# Patient Record
Sex: FEMALE | Race: WHITE | NOT HISPANIC OR LATINO | Employment: OTHER | URBAN - NONMETROPOLITAN AREA
[De-identification: names, ages, dates, MRNs, and addresses within clinical notes are randomized per-mention and may not be internally consistent; named-entity substitution may affect disease eponyms.]

---

## 2017-06-07 ENCOUNTER — OFFICE VISIT (OUTPATIENT)
Dept: CARDIOLOGY | Facility: CLINIC | Age: 82
End: 2017-06-07

## 2017-06-07 VITALS — HEIGHT: 61 IN | DIASTOLIC BLOOD PRESSURE: 68 MMHG | HEART RATE: 72 BPM | SYSTOLIC BLOOD PRESSURE: 122 MMHG

## 2017-06-07 DIAGNOSIS — I25.10 CORONARY ARTERY DISEASE INVOLVING NATIVE CORONARY ARTERY OF NATIVE HEART WITHOUT ANGINA PECTORIS: ICD-10-CM

## 2017-06-07 DIAGNOSIS — Z95.1 HX OF CABG: ICD-10-CM

## 2017-06-07 DIAGNOSIS — I49.5 SSS (SICK SINUS SYNDROME) (HCC): Primary | ICD-10-CM

## 2017-06-07 DIAGNOSIS — I10 ESSENTIAL HYPERTENSION: ICD-10-CM

## 2017-06-07 DIAGNOSIS — E78.00 HYPERCHOLESTEREMIA: ICD-10-CM

## 2017-06-07 DIAGNOSIS — R60.0 LOCALIZED EDEMA: ICD-10-CM

## 2017-06-07 PROCEDURE — 99213 OFFICE O/P EST LOW 20 MIN: CPT | Performed by: NURSE PRACTITIONER

## 2017-06-07 PROCEDURE — 93288 INTERROG EVL PM/LDLS PM IP: CPT | Performed by: INTERNAL MEDICINE

## 2017-06-07 RX ORDER — LANOLIN ALCOHOL/MO/W.PET/CERES
1000 CREAM (GRAM) TOPICAL DAILY
COMMUNITY
End: 2018-06-20 | Stop reason: ALTCHOICE

## 2017-06-07 RX ORDER — PRAVASTATIN SODIUM 20 MG
20 TABLET ORAL DAILY
COMMUNITY
End: 2018-06-20 | Stop reason: ALTCHOICE

## 2017-06-07 NOTE — PROGRESS NOTES
Chief Complaint   Patient presents with   • Follow-up     denies any cardiac problems.    • Med Refill     PCP writes refills and moniters labs.    • Pacemaker Check     Completed today.        Subjective       Carla Flores is a 89 y.o. female has history of ischemic heart disease, diagnosed in 1996 when she underwent bypass surgery.  She also has sick sinus syndrome for which she had pacemaker placed in 1996 and has been replaced twice, last in 2011. Pacemaker interrogation done today showed normal function, minimal lead interference noted, which is not a new finding. Patent denies any new or worsening cardiac symptoms.     HPI         Cardiac History:    Past Surgical History:   Procedure Laterality Date   • CARDIAC PACEMAKER PLACEMENT  03/1996    PPM implant.     • CARDIOVASCULAR STRESS TEST  01/04/2002    D. Myoview- 80% THR, Negative.     • CONVERTED (HISTORICAL) DUPLEX SCANS  02/27/2014    Duplex Venous LXT     • CORONARY ARTERY BYPASS GRAFT  1996     LIMA-LAD, SVG- D1, SVG- Ramus, SVG-OM,and SVG-PDA.     • ECHO - CONVERTED  06/15/2007    EF 50-55%, Mild MR and TR.     • ECHO - CONVERTED  03/03/2010    EF >60%     • PACEMAKER REPLACEMENT  12/31/2003    PPM Change out- (Dr. Contreras.)     • PACEMAKER REPLACEMENT  10/13/2008    PPM Change out- ( Dr. Ojeda)     • PACEMAKER REPLACEMENT  05/05/2011    PPM Change out- (Dr. Ojeda) Implantation of (R) Atrila Lead.         Current Outpatient Prescriptions   Medication Sig Dispense Refill   • aspirin 81 MG EC tablet Take 81 mg by mouth Daily.     • atenolol (TENORMIN) 50 MG tablet Take 50 mg by mouth Daily.     • ibuprofen (ADVIL,MOTRIN) 200 MG tablet 2 tabs 3 times a day prn     • Omega-3 Fatty Acids (FISH OIL) 1200 MG capsule delayed-release Take  by mouth.     • pravastatin (PRAVACHOL) 20 MG tablet Take 20 mg by mouth Daily.     • triamterene-hydrochlorothiazide (MAXZIDE) 75-50 MG per tablet Take 1 tablet by mouth Daily.     • vitamin B-12 (CYANOCOBALAMIN) 1000 MCG  tablet Take 1,000 mcg by mouth Daily.       No current facility-administered medications for this visit.        Morphine and related    Past Medical History:   Diagnosis Date   • Arthritis    • CAD (coronary artery disease)    • H/O total knee replacement     bilat   • History of cholecystectomy    • History of hip replacement     bilat   • Hypercholesterolemia    • Hypertension    • IHD (ischemic heart disease)      S/P bypass surgery   • MR (mitral regurgitation)     & AR, both moderate   • Myocardial infarction    • pulse oximetry 07/04/2012    overnight SpO2 abnormal, O2 started, refused sleep study   • SOB (shortness of breath)    • SSS (sick sinus syndrome)        Social History     Social History   • Marital status:      Spouse name: N/A   • Number of children: N/A   • Years of education: N/A     Occupational History   • Not on file.     Social History Main Topics   • Smoking status: Never Smoker   • Smokeless tobacco: Never Used   • Alcohol use No   • Drug use: No   • Sexual activity: Not on file     Other Topics Concern   • Not on file     Social History Narrative       Family History   Problem Relation Age of Onset   • Other Mother      Possible heart problems   • Heart disease Father    • Heart disease Other        Review of Systems   Constitutional: Positive for fatigue. Negative for activity change and appetite change.   HENT: Negative.    Respiratory: Negative for cough, shortness of breath and wheezing.    Cardiovascular: Positive for leg swelling (mild to moderate, lower legs, left > right).   Gastrointestinal: Negative for abdominal pain, blood in stool and nausea.   Genitourinary: Negative for dysuria and hematuria.   Musculoskeletal: Positive for arthralgias and gait problem (uses wheelchair).   Skin: Negative.    Neurological: Positive for weakness (in general). Negative for syncope, speech difficulty and headaches.   Psychiatric/Behavioral: Negative for confusion and sleep disturbance.  "      Diabetes- No  Thyroid-normal    Objective     /68  Pulse 72  Ht 61\" (154.9 cm)    Physical Exam   Constitutional: She is oriented to person, place, and time. Vital signs are normal.   Eyes: Pupils are equal, round, and reactive to light.   Neck: Neck supple. No JVD present. Carotid bruit is not present.   Cardiovascular: Normal rate, regular rhythm, S1 normal and S2 normal.    Murmur heard.   Systolic murmur is present with a grade of 2/6   Pulses:       Radial pulses are 2+ on the right side, and 2+ on the left side.   Pulmonary/Chest: Effort normal and breath sounds normal. She has no wheezes.   Abdominal: Soft. Bowel sounds are normal. She exhibits no distension. There is no tenderness.   Musculoskeletal: She exhibits edema (1-2+ lower legs).   Neurological: She is alert and oriented to person, place, and time.   Skin: Skin is warm and dry.   Psychiatric: She has a normal mood and affect. Her behavior is normal. Judgment and thought content normal.   Vitals reviewed.    Procedures          Assessment/Plan      Carla was seen today for follow-up, med refill and pacemaker check.    Diagnoses and all orders for this visit:    SSS (sick sinus syndrome)    Coronary artery disease involving native coronary artery of native heart without angina pectoris    Essential hypertension    Hx of CABG    Hypercholesteremia    Localized edema     Her pacemaker interrogation today was stable. Blood pressure is normal. A [facility] ppm checked to be repeated in 6 months. No medication changes made today. I encouraged her to elevate lower legs periodically through out the day, which she reports does help. She follows with you for management of labs and medication refills.                Electronically signed by TIERRA Lorenzana,  June 7, 2017 12:58 PM  "

## 2017-11-15 ENCOUNTER — OFFICE VISIT (OUTPATIENT)
Dept: CARDIOLOGY | Facility: CLINIC | Age: 82
End: 2017-11-15

## 2017-11-15 VITALS — HEART RATE: 68 BPM | DIASTOLIC BLOOD PRESSURE: 60 MMHG | SYSTOLIC BLOOD PRESSURE: 130 MMHG

## 2017-11-15 DIAGNOSIS — R60.0 EDEMA, LOWER EXTREMITY: ICD-10-CM

## 2017-11-15 DIAGNOSIS — I49.5 SSS (SICK SINUS SYNDROME) (HCC): Primary | ICD-10-CM

## 2017-11-15 DIAGNOSIS — I49.5 SSS (SICK SINUS SYNDROME) (HCC): ICD-10-CM

## 2017-11-15 DIAGNOSIS — I25.10 CORONARY ARTERY DISEASE INVOLVING NATIVE CORONARY ARTERY OF NATIVE HEART WITHOUT ANGINA PECTORIS: Primary | ICD-10-CM

## 2017-11-15 DIAGNOSIS — I10 ESSENTIAL HYPERTENSION: ICD-10-CM

## 2017-11-15 DIAGNOSIS — R00.2 PALPITATIONS: ICD-10-CM

## 2017-11-15 DIAGNOSIS — Z95.1 HX OF CABG: ICD-10-CM

## 2017-11-15 PROCEDURE — 93280 PM DEVICE PROGR EVAL DUAL: CPT | Performed by: INTERNAL MEDICINE

## 2017-11-15 PROCEDURE — 99213 OFFICE O/P EST LOW 20 MIN: CPT | Performed by: NURSE PRACTITIONER

## 2017-11-15 NOTE — PROGRESS NOTES
Chief Complaint   Patient presents with   • Follow-up     for cardiac management   • Medication list     pt didn't bring meds or list, was not aware she had a follow-up with her PPM check   • Pacemaker Check     [facility], today.        Subjective       Carla Flores is a 89 y.o. female with a history of ischemic heart disease diagnosed in 1996 when she underwent bypass surgery and pacemaker placement secondary to SSS.  Her generator was changed in 2003, 2008, and the last in 2011.  She came in today for her follow up visit and have her pacemaker interrogated.  Pacemaker check did show AMS due to atrial noise, adjustments were made, and more than 5 years remains on battery life.  She denies having any cardiac symptoms, no chest pain, no shortness of breath. She does have left lower extremity edema which she reports has been present for a long time.  She is mostly confined to a wheelchair but does ambulate some at home.  Unfortunately, her  recently passed away.      HPI         Cardiac History:    Past Surgical History:   Procedure Laterality Date   • CARDIAC PACEMAKER PLACEMENT  03/1996    PPM implant.     • CARDIOVASCULAR STRESS TEST  01/04/2002    SARAH [place]- 80% THR, Negative.     • CONVERTED (HISTORICAL) DUPLEX SCANS  02/27/2014    Duplex Venous LXT     • CORONARY ARTERY BYPASS GRAFT  1996     LIMA-LAD, SVG- D1, SVG- Ramus, SVG-OM,and SVG-PDA.     • ECHO - CONVERTED  06/15/2007    EF 50-55%, Mild MR and TR.     • ECHO - CONVERTED  03/03/2010    EF >60%     • PACEMAKER REPLACEMENT  12/31/2003    PPM Change out- (Dr. Contreras.)     • PACEMAKER REPLACEMENT  10/13/2008    PPM Change out- ( Dr. Ojeda)     • PACEMAKER REPLACEMENT  05/05/2011    PPM Change out- (Dr. Ojeda) Implantation of (R) Atrila Lead.         Current Outpatient Prescriptions   Medication Sig Dispense Refill   • aspirin 81 MG EC tablet Take 81 mg by mouth Daily.     • atenolol (TENORMIN) 50 MG tablet Take 50 mg by mouth Daily.     • ibuprofen  (ADVIL,MOTRIN) 200 MG tablet 2 tabs 3 times a day prn     • Omega-3 Fatty Acids (FISH OIL) 1200 MG capsule delayed-release Take  by mouth.     • pravastatin (PRAVACHOL) 20 MG tablet Take 20 mg by mouth Daily.     • triamterene-hydrochlorothiazide (MAXZIDE) 75-50 MG per tablet Take 1 tablet by mouth Daily.     • vitamin B-12 (CYANOCOBALAMIN) 1000 MCG tablet Take 1,000 mcg by mouth Daily.       No current facility-administered medications for this visit.        Morphine and related    Past Medical History:   Diagnosis Date   • Arthritis    • CAD (coronary artery disease)    • H/O total knee replacement     bilat   • History of cholecystectomy    • History of hip replacement     bilat   • Hypercholesterolemia    • Hypertension    • IHD (ischemic heart disease)      S/P bypass surgery   • MR (mitral regurgitation)     & AR, both moderate   • Myocardial infarction    • pulse oximetry 07/04/2012    overnight SpO2 abnormal, O2 started, refused sleep study   • SOB (shortness of breath)    • SSS (sick sinus syndrome)        Social History     Social History   • Marital status:      Spouse name: N/A   • Number of children: N/A   • Years of education: N/A     Occupational History   • Not on file.     Social History Main Topics   • Smoking status: Never Smoker   • Smokeless tobacco: Never Used   • Alcohol use No   • Drug use: No   • Sexual activity: Not on file     Other Topics Concern   • Not on file     Social History Narrative       Family History   Problem Relation Age of Onset   • Other Mother      Possible heart problems   • Heart disease Father    • Heart disease Other        Review of Systems   Constitution: Positive for weakness and malaise/fatigue. Negative for decreased appetite.   HENT: Positive for hearing loss. Negative for congestion.    Eyes: Negative for blurred vision.   Cardiovascular: Positive for leg swelling. Negative for chest pain and syncope.   Respiratory: Negative for cough and shortness of  breath.    Hematologic/Lymphatic: Negative for bleeding problem.   Skin: Negative for color change.   Musculoskeletal: Positive for arthritis, joint pain and muscle weakness. Negative for myalgias.   Gastrointestinal: Negative for abdominal pain.   Genitourinary: Negative for dysuria.   Neurological: Negative for dizziness.   Psychiatric/Behavioral: Negative for altered mental status.        Diabetes- No  Thyroid-unavailable     Objective     /60  Pulse 68    Physical Exam   Constitutional: She is oriented to person, place, and time. She appears well-developed and well-nourished.   HENT:   Head: Normocephalic.   Eyes: Pupils are equal, round, and reactive to light.   Neck: Normal range of motion.   Cardiovascular: Normal rate and regular rhythm.    Murmur heard.  Pulmonary/Chest: Effort normal. No respiratory distress. She has no wheezes. She has no rales.   Abdominal: Soft. Bowel sounds are normal. She exhibits no distension.   Musculoskeletal: She exhibits edema (significant LE edema, L>R, 2-3+ on left ).   Neurological: She is alert and oriented to person, place, and time.   Skin: Skin is warm and dry. There is pallor.   Psychiatric: She has a normal mood and affect.   Nursing note and vitals reviewed.       Procedures          Assessment/Plan    Heart rate and blood pressure stable. Condolences were given regarding her .  Her pacemaker was interrogated with changes made.  No changes made to medications.  She insists her leg swelling goes down at night and she is not having any pain with this.  She has not taken her diuretic today as she had appointments.  She was encouraged to elevate her legs if possible while sitting.  Limit sodium intake as she can.  No cardiac testing ordered today as she remains asymptomatic.  Labs and refills have been managed with primary care.  We will see her back in six months with a pacemaker check or sooner if any new problems develop.   Carla was seen today for  follow-up, medication list and pacemaker check.    Diagnoses and all orders for this visit:    Coronary artery disease involving native coronary artery of native heart without angina pectoris    Edema, lower extremity    Essential hypertension    SSS (sick sinus syndrome)    Hx of CABG                    Electronically signed by TIERRA Andrade,  November 17, 2017 1:48 PM

## 2018-05-24 ENCOUNTER — OUTSIDE FACILITY SERVICE (OUTPATIENT)
Dept: CARDIOLOGY | Facility: CLINIC | Age: 83
End: 2018-05-24

## 2018-05-24 PROCEDURE — 99223 1ST HOSP IP/OBS HIGH 75: CPT | Performed by: INTERNAL MEDICINE

## 2018-05-25 ENCOUNTER — OUTSIDE FACILITY SERVICE (OUTPATIENT)
Dept: CARDIOLOGY | Facility: CLINIC | Age: 83
End: 2018-05-25

## 2018-05-25 PROCEDURE — 99232 SBSQ HOSP IP/OBS MODERATE 35: CPT | Performed by: INTERNAL MEDICINE

## 2018-05-26 ENCOUNTER — OUTSIDE FACILITY SERVICE (OUTPATIENT)
Dept: CARDIOLOGY | Facility: CLINIC | Age: 83
End: 2018-05-26

## 2018-05-26 PROCEDURE — 99232 SBSQ HOSP IP/OBS MODERATE 35: CPT | Performed by: INTERNAL MEDICINE

## 2018-06-20 ENCOUNTER — OFFICE VISIT (OUTPATIENT)
Dept: CARDIOLOGY | Facility: CLINIC | Age: 83
End: 2018-06-20

## 2018-06-20 VITALS — HEIGHT: 61 IN | HEART RATE: 78 BPM | DIASTOLIC BLOOD PRESSURE: 62 MMHG | SYSTOLIC BLOOD PRESSURE: 118 MMHG

## 2018-06-20 DIAGNOSIS — I49.5 SSS (SICK SINUS SYNDROME) (HCC): Primary | ICD-10-CM

## 2018-06-20 DIAGNOSIS — Z95.1 HX OF CABG: ICD-10-CM

## 2018-06-20 DIAGNOSIS — Z78.9 PACED RHYTHM ON ELECTROCARDIOGRAM (ECG): ICD-10-CM

## 2018-06-20 DIAGNOSIS — I25.10 CORONARY ARTERY DISEASE INVOLVING NATIVE CORONARY ARTERY OF NATIVE HEART WITHOUT ANGINA PECTORIS: ICD-10-CM

## 2018-06-20 DIAGNOSIS — E78.00 HYPERCHOLESTEREMIA: ICD-10-CM

## 2018-06-20 DIAGNOSIS — I10 ESSENTIAL HYPERTENSION: ICD-10-CM

## 2018-06-20 PROCEDURE — 93000 ELECTROCARDIOGRAM COMPLETE: CPT | Performed by: NURSE PRACTITIONER

## 2018-06-20 PROCEDURE — 99214 OFFICE O/P EST MOD 30 MIN: CPT | Performed by: NURSE PRACTITIONER

## 2018-06-20 RX ORDER — DOCUSATE CALCIUM 240 MG
240 CAPSULE ORAL 2 TIMES DAILY
COMMUNITY

## 2018-06-20 RX ORDER — ASCORBIC ACID 500 MG
500 TABLET ORAL 2 TIMES DAILY
COMMUNITY

## 2018-06-20 RX ORDER — ALBUTEROL SULFATE 90 UG/1
2 AEROSOL, METERED RESPIRATORY (INHALATION) EVERY 4 HOURS PRN
COMMUNITY

## 2018-06-20 RX ORDER — OMEPRAZOLE 20 MG/1
20 CAPSULE, DELAYED RELEASE ORAL DAILY
COMMUNITY
End: 2018-11-21

## 2018-06-20 RX ORDER — POLYETHYLENE GLYCOL 3350 17 G/17G
17 POWDER, FOR SOLUTION ORAL DAILY
COMMUNITY

## 2018-06-20 RX ORDER — MIRTAZAPINE 15 MG/1
15 TABLET, FILM COATED ORAL NIGHTLY
COMMUNITY

## 2018-06-20 RX ORDER — ACETAMINOPHEN 500 MG
1000 TABLET ORAL EVERY 6 HOURS PRN
COMMUNITY

## 2018-06-20 RX ORDER — IRON POLYSACCHARIDE COMPLEX 150 MG
150 CAPSULE ORAL 2 TIMES DAILY
COMMUNITY
End: 2019-05-15

## 2018-06-20 NOTE — PATIENT INSTRUCTIONS
Cholesterol  Cholesterol is a fat. Your body needs a small amount of cholesterol. Cholesterol (plaque) may build up in your blood vessels (arteries). That makes you more likely to have a heart attack or stroke.  You cannot feel your cholesterol level. Having a blood test is the only way to find out if your level is high. Keep your test results. Work with your doctor to keep your cholesterol at a good level.  What do the results mean?  · Total cholesterol is how much cholesterol is in your blood.  · LDL is bad cholesterol. This is the type that can build up. Try to have low LDL.  · HDL is good cholesterol. It cleans your blood vessels and carries LDL away. Try to have high HDL.  · Triglycerides are fat that the body can store or burn for energy.  What are good levels of cholesterol?  · Total cholesterol below 200.  · LDL below 100 is good for people who have health risks. LDL below 70 is good for people who have very high risks.  · HDL above 40 is good. It is best to have HDL of 60 or higher.  · Triglycerides below 150.  How can I lower my cholesterol?  Diet  Follow your diet program as told by your doctor.  · Choose fish, white meat chicken, or turkey that is roasted or baked. Try not to eat red meat, fried foods, sausage, or lunch meats.  · Eat lots of fresh fruits and vegetables.  · Choose whole grains, beans, pasta, potatoes, and cereals.  · Choose olive oil, corn oil, or canola oil. Only use small amounts.  · Try not to eat butter, mayonnaise, shortening, or palm kernel oils.  · Try not to eat foods with trans fats.  · Choose low-fat or nonfat dairy foods.  ? Drink skim or nonfat milk.  ? Eat low-fat or nonfat yogurt and cheeses.  ? Try not to drink whole milk or cream.  ? Try not to eat ice cream, egg yolks, or full-fat cheeses.  · Healthy desserts include patricia food cake, daniel snaps, animal crackers, hard candy, popsicles, and low-fat or nonfat frozen yogurt. Try not to eat pastries, cakes, pies, and  cookies.    Exercise  Follow your exercise program as told by your doctor.  · Be more active. Try gardening, walking, and taking the stairs.  · Ask your doctor about ways that you can be more active.    Medicine  · Take over-the-counter and prescription medicines only as told by your doctor.  This information is not intended to replace advice given to you by your health care provider. Make sure you discuss any questions you have with your health care provider.  Document Released: 03/16/2010 Document Revised: 07/19/2017 Document Reviewed: 06/29/2017  [employer] Patient Education © 2018 [employer]

## 2018-06-20 NOTE — PROGRESS NOTES
Chief Complaint   Patient presents with   • Follow-up     Sees [facility] for PCP. PCP refills meds. Labs per hospital about 10 days ago. Was in hospital recently for UTI and CO2 retention- now uses BiPAP at night.    • Device Check       Subjective       Carla Flores is a 89 y.o. female  has history of ischemic heart disease, diagnosed in 1996 when she underwent bypass surgery.  She also has sick sinus syndrome for which she had pacemaker placed in 1996 and has been replaced twice, last in 2011. In May 2018, she was hospitalized, diagnosed and treated for hypercapnic and hypoxic respiratory failure secondary to UTI, and CAP. Dr. Jc was consulted. Her previous ppm interrogation showed multiple PMT. Her PVAR was increased. On telemetry, she had runs of narrow complex tachycardia, no V-tach. An interrogation was done showing <1% AMS, 79% atrial and 20% ventricular pacing. LEE 51%. Several changes were made including increase in PVAR. An echocardiogram was done that showed normal LVEF being 60%. She was placed on BiPAP for management of CO2.   Today she comes to the office for a hospital follow up visit accompanied by her daughter. She remains more alert. She is using BiPAP with some difficulty and daily oxygen per [place] without problem. No chest pain or palpitations reported. Edema remains improved. Galvez catheter remains in place. She is in wheelchair and lift is used to help with transfers.     HPI     Cardiac History:    Past Surgical History:   Procedure Laterality Date   • CARDIAC PACEMAKER PLACEMENT  03/1996    PPM implant.     • CARDIOVASCULAR STRESS TEST  01/04/2002    [facility]es [place]- 80% THR, Negative.     • CONVERTED (HISTORICAL) DUPLEX SCANS  02/27/2014    Duplex Venous LXT     • CORONARY ARTERY BYPASS GRAFT  1996     LIMA-LAD, SVG- D1, SVG- Ramus, SVG-OM,and SVG-PDA.     • ECHO - CONVERTED  06/15/2007    EF 50-55%, Mild MR and TR.     • ECHO - CONVERTED  03/03/2010    EF >60%     • ECHO - CONVERTED  05/22/2018     [facility]- Dr. CYNTHIA Hernandez- LVEF 60-65%, no pericardial effusion,    • PACEMAKER REPLACEMENT  12/31/2003    PPM Change out- (Dr. Contreras.)     • PACEMAKER REPLACEMENT  10/13/2008    PPM Change out- ( Dr. Ojeda)     • PACEMAKER REPLACEMENT  05/05/2011    PPM Change out- (Dr. Ojeda) Implantation of (R) Atrila Lead.         Current Outpatient Prescriptions   Medication Sig Dispense Refill   • acetaminophen (TYLENOL) 500 MG tablet Take 1,000 mg by mouth Every 6 (Six) Hours As Needed for Mild Pain .     • albuterol (PROVENTIL HFA;VENTOLIN HFA) 108 (90 Base) MCG/ACT inhaler Inhale 2 puffs Every 4 (Four) Hours As Needed for Wheezing.     • aspirin 81 MG EC tablet Take 81 mg by mouth Daily.     • atenolol (TENORMIN) 50 MG tablet Take 50 mg by mouth Daily.     • Cranberry 450 MG tablet Take  by mouth.     • docusate calcium (SURFAK) 240 MG capsule Take 240 mg by mouth 2 (Two) Times a Day.     • iron polysaccharides (NIFEREX) 150 MG capsule Take 150 mg by mouth 2 (Two) Times a Day.     • mirtazapine (REMERON) 15 MG tablet Take 15 mg by mouth Every Night.     • Omega-3 Fatty Acids (FISH OIL) 1200 MG capsule delayed-release Take  by mouth.     • omeprazole (priLOSEC) 20 MG capsule Take 20 mg by mouth Daily.     • polyethylene glycol (MIRALAX) packet Take 17 g by mouth Daily.     • vitamin C (ASCORBIC ACID) 500 MG tablet Take 500 mg by mouth 2 (Two) Times a Day.       No current facility-administered medications for this visit.        Morphine and related    Past Medical History:   Diagnosis Date   • Arthritis    • CAD (coronary artery disease)    • H/O total knee replacement     bilat   • History of cholecystectomy    • History of hip replacement     bilat   • Hypercholesterolemia    • Hypertension    • IHD (ischemic heart disease)      S/P bypass surgery   • MR (mitral regurgitation)     & AR, both moderate   • Myocardial infarction    • pulse oximetry 07/04/2012    overnight SpO2 abnormal, O2 started, refused sleep study   • SOB  "(shortness of breath)    • SSS (sick sinus syndrome)        Social History     Social History   • Marital status:      Spouse name: N/A   • Number of children: N/A   • Years of education: N/A     Occupational History   • Not on file.     Social History Main Topics   • Smoking status: Never Smoker   • Smokeless tobacco: Never Used   • Alcohol use No   • Drug use: No   • Sexual activity: Not on file     Other Topics Concern   • Not on file     Social History Narrative   • No narrative on file       Family History   Problem Relation Age of Onset   • Other Mother         Possible heart problems   • Heart disease Father    • Heart disease Other        Review of Systems   Constitution: Positive for weakness and malaise/fatigue. Negative for decreased appetite, diaphoresis and fever.   HENT: Positive for hearing loss. Negative for congestion, hoarse voice and nosebleeds.    Eyes: Negative for pain and redness.   Cardiovascular: Positive for leg swelling (mild). Negative for chest pain and near-syncope.   Respiratory: Negative for cough and shortness of breath.    Skin: Positive for suspicious lesions (right anterior lower leg with small \"blood blister\", no swelling or redness). Negative for dry skin and itching.   Musculoskeletal: Positive for muscle weakness. Negative for muscle cramps.   Gastrointestinal: Negative for abdominal pain, dysphagia, heartburn and melena.   Genitourinary: Positive for bladder incontinence. Negative for hematuria.        Indwelling tapia catheter in place   Neurological: Negative for dizziness and headaches.   Psychiatric/Behavioral: Positive for memory loss. The patient is not nervous/anxious.    Allergic/Immunologic: Negative for hives.        Objective     /62   Pulse 78   Ht 154.9 cm (61\")     Physical Exam   Constitutional: Vital signs are normal. She has a sickly appearance. No distress.   Eyes: Pupils are equal, round, and reactive to light.   Neck: Neck supple. No JVD " present. Carotid bruit is not present.   Cardiovascular: Normal rate and normal heart sounds.    Pulses:       Radial pulses are 2+ on the right side, and 2+ on the left side.   Pulmonary/Chest: She has no wheezes. She has no rales.   Abdominal: Soft. Bowel sounds are normal. She exhibits no distension. There is no tenderness.   Musculoskeletal: She exhibits edema (very mild lower legs).   Neurological: She is alert.   Skin: Skin is warm and dry. No pallor.   Small approx 2cm x 2cm size hematoma. Surrounding skin    Psychiatric: She has a normal mood and affect.        ECG 12 Lead  Date/Time: 6/20/2018 4:51 PM  Performed by: AMY HAN  Authorized by: AMY HAN   Comparison: compared with previous ECG from 11/15/2016  Comparison to previous ECG: Atrial and ventricular pacing  Rhythm: paced  BPM: 78              Assessment/Plan      Carla was seen today for follow-up and device check.    Diagnoses and all orders for this visit:    SSS (sick sinus syndrome)    Essential hypertension    Paced rhythm on electrocardiogram (ECG)    Hx of CABG    Coronary artery disease involving native coronary artery of native heart without angina pectoris    Hypercholesteremia    Other orders  -     ECG 12 Lead       There is no height or weight on file to calculate BMI.  Patient is in wheelchair.      EKG for management of pacemaker shows atrial and ventricular pacing. A repeat st. [facility] pacemaker interrogation scheduled for November.     Her blood pressure is normal. Edema is well controlled. The report of her most recent echocardiogram was reviewed which showed preserved ejection fraction. I did not restart diuretic. Advised to continue same dose of atenolol. She is on aspirin for CAD without signs of bleeding. Advised to continue the same.     She will follow with you for management of labs. She is no longer taking statin therapy. I did not restart today. I encouraged her on heart healthy diet and avoid added salt.     A 6  month follow up scheduled. Please call sooner for problems.            Electronically signed by TIERRA Lorenzana,  June 20, 2018 5:20 PM

## 2018-11-21 ENCOUNTER — OFFICE VISIT (OUTPATIENT)
Dept: CARDIOLOGY | Facility: CLINIC | Age: 83
End: 2018-11-21

## 2018-11-21 VITALS — SYSTOLIC BLOOD PRESSURE: 110 MMHG | HEART RATE: 80 BPM | HEIGHT: 61 IN | DIASTOLIC BLOOD PRESSURE: 72 MMHG

## 2018-11-21 DIAGNOSIS — E78.00 HYPERCHOLESTEREMIA: ICD-10-CM

## 2018-11-21 DIAGNOSIS — I10 ESSENTIAL HYPERTENSION: ICD-10-CM

## 2018-11-21 DIAGNOSIS — I25.10 CORONARY ARTERY DISEASE INVOLVING NATIVE CORONARY ARTERY OF NATIVE HEART WITHOUT ANGINA PECTORIS: ICD-10-CM

## 2018-11-21 DIAGNOSIS — Z95.1 HX OF CABG: ICD-10-CM

## 2018-11-21 DIAGNOSIS — I49.5 SSS (SICK SINUS SYNDROME) (HCC): Primary | ICD-10-CM

## 2018-11-21 PROCEDURE — 93288 INTERROG EVL PM/LDLS PM IP: CPT | Performed by: INTERNAL MEDICINE

## 2018-11-21 PROCEDURE — 99213 OFFICE O/P EST LOW 20 MIN: CPT | Performed by: NURSE PRACTITIONER

## 2018-11-21 RX ORDER — METHENAMINE HIPPURATE 1000 MG/1
0.5 TABLET ORAL 2 TIMES DAILY WITH MEALS
Status: ON HOLD | COMMUNITY
End: 2020-01-01

## 2018-11-21 NOTE — PROGRESS NOTES
Chief Complaint   Patient presents with   • Follow-up     for cardiac management, doing well   • Med Refill     [facility] is writing all her refills   • Pacemaker Check     St ranjan check today, no changes       Subjective       Carla Flores is a 89 y.o. female  has history of ischemic heart disease, diagnosed in 1996 when she underwent bypass surgery.  She also has sick sinus syndrome for which she had pacemaker placed in 1996 and has been replaced twice, last in 2011. In May 2018, she was hospitalized, diagnosed and treated for hypercapnic and hypoxic respiratory failure secondary to UTI, and CAP. Dr. Jc was consulted. Her previous ppm interrogation showed multiple PMT. Her PVAR was increased. On telemetry, she had runs of narrow complex tachycardia, no V-tach. An interrogation was done showing <1% AMS, 79% atrial and 20% ventricular pacing. LEE 51%. Several changes were made including increase in PVAR. An echocardiogram was done that showed normal LVEF being 60%. She was placed on BiPAP for management of CO2.     Today she comes to the office for a pacemaker interrogation and follow up visit. She has been treated for significant UTI and urinary catheter now removed. No recent symptoms. No cardiac symptoms are voiced. She remains wheelchair bound, requires assistance with transfers. She is using supplemental oxygen during day and BiPAP at night.     HPI     Cardiac History:    Past Surgical History:   Procedure Laterality Date   • CARDIAC PACEMAKER PLACEMENT  03/1996    PPM implant.     • CARDIOVASCULAR STRESS TEST  01/04/2002    D. Myoview- 80% THR, Negative.     • CONVERTED (HISTORICAL) DUPLEX SCANS  02/27/2014    Duplex Venous LXT     • CORONARY ARTERY BYPASS GRAFT  1996     LIMA-LAD, SVG- D1, SVG- Ramus, SVG-OM,and SVG-PDA.     • ECHO - CONVERTED  06/15/2007    EF 50-55%, Mild MR and TR.     • ECHO - CONVERTED  03/03/2010    EF >60%     • ECHO - CONVERTED  05/22/2018    [facility]- Dr. CYNTHIA Hernandez- LVEF 60-65%, no  pericardial effusion,    • PACEMAKER REPLACEMENT  12/31/2003    PPM Change out- (Dr. Contreras.)     • PACEMAKER REPLACEMENT  10/13/2008    PPM Change out- ( Dr. Ojeda)     • PACEMAKER REPLACEMENT  05/05/2011    PPM Change out- (Dr. Ojeda) Implantation of (R) Atrila Lead.         Current Outpatient Medications   Medication Sig Dispense Refill   • acetaminophen (TYLENOL) 500 MG tablet Take 1,000 mg by mouth Every 6 (Six) Hours As Needed for Mild Pain .     • albuterol (PROVENTIL HFA;VENTOLIN HFA) 108 (90 Base) MCG/ACT inhaler Inhale 2 puffs Every 4 (Four) Hours As Needed for Wheezing.     • aspirin 81 MG EC tablet Take 81 mg by mouth Daily.     • atenolol (TENORMIN) 50 MG tablet Take 50 mg by mouth Daily.     • Cranberry 450 MG tablet Take  by mouth.     • docusate calcium (SURFAK) 240 MG capsule Take 240 mg by mouth 2 (Two) Times a Day.     • iron polysaccharides (NIFEREX) 150 MG capsule Take 150 mg by mouth 2 (Two) Times a Day.     • methenamine (HIPREX) 1 g tablet Take 1 g by mouth Daily.     • mirtazapine (REMERON) 15 MG tablet Take 15 mg by mouth Every Night.     • Omega-3 Fatty Acids (FISH OIL) 1200 MG capsule delayed-release Take  by mouth.     • polyethylene glycol (MIRALAX) packet Take 17 g by mouth Daily.     • vitamin C (ASCORBIC ACID) 500 MG tablet Take 500 mg by mouth 2 (Two) Times a Day.       No current facility-administered medications for this visit.        Morphine and related    Past Medical History:   Diagnosis Date   • Arthritis    • CAD (coronary artery disease)    • H/O total knee replacement     bilat   • History of cholecystectomy    • History of hip replacement     bilat   • Hypercholesterolemia    • Hypertension    • IHD (ischemic heart disease)      S/P bypass surgery   • MR (mitral regurgitation)     & AR, both moderate   • Myocardial infarction (CMS/HCC)    • pulse oximetry 07/04/2012    overnight SpO2 abnormal, O2 started, refused sleep study   • SOB (shortness of breath)    • SSS (sick  sinus syndrome) (CMS/HCC)        Social History     Socioeconomic History   • Marital status:      Spouse name: Not on file   • Number of children: Not on file   • Years of education: Not on file   • Highest education level: Not on file   Social Needs   • Financial resource strain: Not on file   • Food insecurity - worry: Not on file   • Food insecurity - inability: Not on file   • Transportation needs - medical: Not on file   • Transportation needs - non-medical: Not on file   Occupational History   • Not on file   Tobacco Use   • Smoking status: Never Smoker   • Smokeless tobacco: Never Used   Substance and Sexual Activity   • Alcohol use: No   • Drug use: No   • Sexual activity: Not on file   Other Topics Concern   • Not on file   Social History Narrative   • Not on file       Family History   Problem Relation Age of Onset   • Other Mother         Possible heart problems   • Heart disease Father    • Heart disease Other        Review of Systems   Constitution: Positive for weakness and malaise/fatigue. Negative for decreased appetite.   HENT: Positive for hearing loss. Negative for hoarse voice.    Eyes: Negative for redness and visual disturbance.   Cardiovascular: Positive for leg swelling (mild, same). Negative for chest pain, near-syncope and palpitations.   Respiratory: Positive for shortness of breath and sleep disturbances due to breathing (uses BiPAP). Negative for sputum production.    Endocrine: Positive for cold intolerance and heat intolerance.   Hematologic/Lymphatic: Negative for bleeding problem. Does not bruise/bleed easily.   Skin: Negative for color change and itching.   Musculoskeletal: Positive for joint pain and muscle weakness. Negative for falls.   Gastrointestinal: Negative for abdominal pain, dysphagia and melena.   Genitourinary: Positive for bladder incontinence, dysuria and hematuria.   Neurological: Negative for headaches and light-headedness.   Psychiatric/Behavioral: Negative  "for memory loss. The patient is not nervous/anxious.         Objective     /72   Pulse 80   Ht 154.9 cm (61\")     Physical Exam   Constitutional: She is oriented to person, place, and time. She appears well-nourished. No distress.   HENT:   Head: Normocephalic.   Eyes: Conjunctivae are normal. Pupils are equal, round, and reactive to light.   Neck: Normal range of motion. Neck supple. Carotid bruit is not present.   Cardiovascular: Normal rate, regular rhythm and S1 normal.   No murmur heard.  Pulses:       Radial pulses are 2+ on the right side, and 2+ on the left side.        Dorsalis pedis pulses are 1+ on the right side, and 1+ on the left side.   Loud S2   Pulmonary/Chest: Effort normal. No accessory muscle usage. No respiratory distress. She has decreased breath sounds in the right upper field and the left upper field. She has no wheezes. She has no rales.   Abdominal: Soft. Bowel sounds are normal. There is no tenderness.   Musculoskeletal: Normal range of motion. She exhibits edema. She exhibits no tenderness.   Neurological: She is alert and oriented to person, place, and time.   Skin: Skin is warm and dry. No pallor.   Psychiatric: She has a normal mood and affect.      Procedures: none today        Assessment/Plan      Carla was seen today for follow-up, med refill and pacemaker check.    Diagnoses and all orders for this visit:    SSS (sick sinus syndrome) (CMS/[facility])    Essential hypertension    Coronary artery disease involving native coronary artery of native heart without angina pectoris    Hypercholesteremia    Hx of CABG    Her vital signs today are normal. Pacemaker interrogation done today and showed normal function with > 2 years battery life. A repeat interrogation scheduled to be done in 6 months.     No medication changes advised. She follows with PCP for management of medication refills and lab orders.     Patient's There is no height or weight on file to calculate BMI. patient is in " wheelchair.    I encouraged her to continue to use incentive spirometer for pulmonary management.   She is using BiPAP at night without problems. Continue same.     From a cardiac standpoint, Ms. Flores appears stable. No testing recommended at this time. We will see her back in 6 months, or sooner for problems.             Electronically signed by TIERRA Lorenzana,  November 21, 2018 2:51 PM

## 2019-01-01 ENCOUNTER — OFFICE VISIT (OUTPATIENT)
Dept: CARDIOLOGY | Facility: CLINIC | Age: 84
End: 2019-01-01

## 2019-01-01 VITALS — DIASTOLIC BLOOD PRESSURE: 78 MMHG | HEART RATE: 72 BPM | HEIGHT: 61 IN | SYSTOLIC BLOOD PRESSURE: 116 MMHG

## 2019-01-01 DIAGNOSIS — Z95.1 HX OF CABG: ICD-10-CM

## 2019-01-01 DIAGNOSIS — I10 ESSENTIAL HYPERTENSION: ICD-10-CM

## 2019-01-01 DIAGNOSIS — I49.5 SSS (SICK SINUS SYNDROME) (HCC): Primary | ICD-10-CM

## 2019-01-01 DIAGNOSIS — I49.5 SSS (SICK SINUS SYNDROME) (HCC): ICD-10-CM

## 2019-01-01 DIAGNOSIS — E78.00 HYPERCHOLESTEREMIA: ICD-10-CM

## 2019-01-01 DIAGNOSIS — R01.1 MURMUR, CARDIAC: ICD-10-CM

## 2019-01-01 DIAGNOSIS — I25.9 IHD (ISCHEMIC HEART DISEASE): Primary | ICD-10-CM

## 2019-01-01 PROCEDURE — 99213 OFFICE O/P EST LOW 20 MIN: CPT | Performed by: INTERNAL MEDICINE

## 2019-01-01 PROCEDURE — 93280 PM DEVICE PROGR EVAL DUAL: CPT | Performed by: INTERNAL MEDICINE

## 2019-01-01 PROCEDURE — 93000 ELECTROCARDIOGRAM COMPLETE: CPT | Performed by: INTERNAL MEDICINE

## 2019-05-15 ENCOUNTER — OFFICE VISIT (OUTPATIENT)
Dept: CARDIOLOGY | Facility: CLINIC | Age: 84
End: 2019-05-15

## 2019-05-15 VITALS — SYSTOLIC BLOOD PRESSURE: 118 MMHG | HEART RATE: 75 BPM | HEIGHT: 61 IN | DIASTOLIC BLOOD PRESSURE: 80 MMHG

## 2019-05-15 DIAGNOSIS — I49.5 SSS (SICK SINUS SYNDROME) (HCC): Primary | ICD-10-CM

## 2019-05-15 DIAGNOSIS — I49.5 SSS (SICK SINUS SYNDROME) (HCC): ICD-10-CM

## 2019-05-15 DIAGNOSIS — E78.00 HYPERCHOLESTEREMIA: ICD-10-CM

## 2019-05-15 DIAGNOSIS — I10 ESSENTIAL HYPERTENSION: ICD-10-CM

## 2019-05-15 DIAGNOSIS — R01.1 MURMUR, CARDIAC: ICD-10-CM

## 2019-05-15 DIAGNOSIS — I25.9 IHD (ISCHEMIC HEART DISEASE): ICD-10-CM

## 2019-05-15 DIAGNOSIS — I25.9 IHD (ISCHEMIC HEART DISEASE): Primary | ICD-10-CM

## 2019-05-15 PROBLEM — I25.10 CORONARY ARTERY DISEASE INVOLVING NATIVE CORONARY ARTERY OF NATIVE HEART WITHOUT ANGINA PECTORIS: Status: RESOLVED | Noted: 2017-06-07 | Resolved: 2019-05-15

## 2019-05-15 PROCEDURE — 99213 OFFICE O/P EST LOW 20 MIN: CPT | Performed by: INTERNAL MEDICINE

## 2019-05-15 PROCEDURE — 93288 INTERROG EVL PM/LDLS PM IP: CPT | Performed by: INTERNAL MEDICINE

## 2019-05-15 NOTE — PROGRESS NOTES
Chief Complaint   Patient presents with   • Follow-up     cardiac management   • [facility] Demet[facility] PPM     checked today   • Labs     [facility] checks labs every 3-4 months.  No refills needed.       CARDIAC COMPLAINTS  dyspnea and fatigue        Subjective   Carla Flores is a 89 y.o. female came in today for her follow-up visit with her daughter.  She has history of ischemic heart disease diagnosed in 1996 when she underwent bypass surgery.  She also has sick sinus syndrome for which she had pacemaker placed in 96 followed by change out in 2003 and again in 2011.  She came today with no new symptoms other than occasional shortness of breath.  She has labs done by [facility].  I do not have any lab results.  She denies any new symptoms other than occasional shortness of breath.  Her pacemaker check showed she is still pacing little more than 90% at the atrial level and little more than 75% at the ventricle level.  Her battery voltage is around 2.77 with estimated battery life of close to 1.5 years.              Cardiac History  Past Surgical History:   Procedure Laterality Date   • CARDIAC PACEMAKER PLACEMENT  03/1996    PPM implant.     • CARDIOVASCULAR STRESS TEST  01/04/2002    D. Myoview- 80% THR, Negative.     • CONVERTED (HISTORICAL) DUPLEX SCANS  02/27/2014    Duplex Venous LXT     • CORONARY ARTERY BYPASS GRAFT  1996     LIMA-LAD, SVG- D1, SVG- Ramus, SVG-OM,and SVG-PDA.     • ECHO - CONVERTED  06/15/2007    EF 50-55%, Mild MR and TR.     • ECHO - CONVERTED  03/03/2010    EF >60%     • ECHO - CONVERTED  05/22/2018    [facility]- Dr. CYNTHIA Hernandez- LVEF 60-65%, no pericardial effusion,    • PACEMAKER REPLACEMENT  12/31/2003    PPM Change out- (Dr. Contreras.)     • PACEMAKER REPLACEMENT  10/13/2008    PPM Change out- ( Dr. Ojeda)     • PACEMAKER REPLACEMENT  05/05/2011    PPM Change out- (Dr. Ojeda) Implantation of (R) Atrila Lead.         Current Outpatient Medications   Medication Sig Dispense Refill   • acetaminophen (TYLENOL) 500 MG tablet  Take 1,000 mg by mouth Every 6 (Six) Hours As Needed for Mild Pain .     • albuterol (PROVENTIL HFA;VENTOLIN HFA) 108 (90 Base) MCG/ACT inhaler Inhale 2 puffs Every 4 (Four) Hours As Needed for Wheezing.     • aspirin 81 MG EC tablet Take 81 mg by mouth Daily.     • atenolol (TENORMIN) 50 MG tablet Take 50 mg by mouth Daily.     • Cranberry 450 MG tablet Take  by mouth.     • docusate calcium (SURFAK) 240 MG capsule Take 240 mg by mouth 2 (Two) Times a Day.     • methenamine (HIPREX) 1 g tablet Take 1 g by mouth Daily.     • mirtazapine (REMERON) 15 MG tablet Take 15 mg by mouth Every Night.     • Omega-3 Fatty Acids (FISH OIL) 1200 MG capsule delayed-release Take  by mouth.     • polyethylene glycol (MIRALAX) packet Take 17 g by mouth Daily.     • vitamin C (ASCORBIC ACID) 500 MG tablet Take 500 mg by mouth 2 (Two) Times a Day.       No current facility-administered medications for this visit.        Allergies  :  Morphine and related       Past Medical History:   Diagnosis Date   • Arthritis    • CAD (coronary artery disease)    • H/O total knee replacement     bilat   • History of cholecystectomy    • History of hip replacement     bilat   • Hypercholesterolemia    • Hypertension    • IHD (ischemic heart disease)      S/P bypass surgery   • MR (mitral regurgitation)     & AR, both moderate   • Myocardial infarction (CMS/HCC)    • pulse oximetry 07/04/2012    overnight SpO2 abnormal, O2 started, refused sleep study   • SOB (shortness of breath)    • SSS (sick sinus syndrome) (CMS/[facility])        Social History     Socioeconomic History   • Marital status:      Spouse name: Not on file   • Number of children: Not on file   • Years of education: Not on file   • Highest education level: Not on file   Tobacco Use   • Smoking status: Never Smoker   • Smokeless tobacco: Never Used   Substance and Sexual Activity   • Alcohol use: No   • Drug use: No       Family History   Problem Relation Age of Onset   • Other Mother  "        Possible heart problems   • Heart disease Father    • Heart disease Other        Review of Systems   Constitution: Positive for weakness and malaise/fatigue. Negative for decreased appetite.   HENT: Negative for congestion and sore throat.    Eyes: Negative for blurred vision.   Cardiovascular: Positive for dyspnea on exertion. Negative for chest pain.   Respiratory: Positive for shortness of breath. Negative for snoring.    Endocrine: Negative for cold intolerance and heat intolerance.   Hematologic/Lymphatic: Negative for adenopathy. Does not bruise/bleed easily.   Skin: Negative for itching, nail changes and skin cancer.   Musculoskeletal: Positive for muscle weakness. Negative for arthritis and myalgias.   Gastrointestinal: Negative for abdominal pain, dysphagia and heartburn.   Genitourinary: Negative for bladder incontinence and frequency.   Neurological: Negative for dizziness, light-headedness, seizures and vertigo.   Psychiatric/Behavioral: Negative for altered mental status.   Allergic/Immunologic: Negative for environmental allergies and hives.       Diabetes- No  Thyroid- normal    Objective     /80 (BP Location: Left arm, Patient Position: Sitting)   Pulse 75   Ht 154.9 cm (61\")     Physical Exam   Constitutional: She is oriented to person, place, and time. She appears well-developed and well-nourished.   HENT:   Head: Normocephalic.   Nose: Nose normal.   Eyes: EOM are normal. Pupils are equal, round, and reactive to light.   Neck: Normal range of motion. Neck supple.   Cardiovascular: Normal rate, regular rhythm, S1 normal and S2 normal.   Murmur heard.  Pulmonary/Chest: Effort normal and breath sounds normal.   Abdominal: Soft. Bowel sounds are normal.   Musculoskeletal: Normal range of motion. She exhibits no edema.   Neurological: She is alert and oriented to person, place, and time.   Skin: Skin is warm and dry.   Psychiatric: She has a normal mood and affect.     Procedures       "      Assessment/Plan   Patient's There is no height or weight on file to calculate BMI. BMI is within normal parameters. No follow-up required..     Carla was seen today for follow-up, st. ranjan ppm and labs.    Diagnoses and all orders for this visit:    IHD (ischemic heart disease)    Essential hypertension    Hypercholesteremia    SSS (sick sinus syndrome) (CMS/HCC)    Murmur, cardiac    At baseline at heart rate and blood pressure appears stable.  He was not able to be weighed secondary to being in the wheelchair.  Her clinical examination is unremarkable other than short systolic murmur at the mitral area.  At this time continue the same medications.  Regarding the coronary artery disease continue the aspirin.  At this time she is not taking any statins for her cholesterol.  She is advised to talk to you about it.  Regarding the sick sinus syndrome the pacemaker appears to be working adequately.  We will recheck it again in 6 months.  Reassurance about her cardiac status was given.  I will really appreciate, if you can send me a copy of her labs.                    Electronically signed by John Jc MD May 15, 2019 2:53 PM

## 2019-12-18 NOTE — PROGRESS NOTES
Chief Complaint   Patient presents with   • Follow-up     for cardiac management, pt reports doing well   • Med Refill     [facility] is seeing pt now, they do all the refills   • Labs     2 weeks ago from PCP, Vit D was a little low   • Pacemaker Check     St Demetris checked today       CARDIAC COMPLAINTS  dyspnea and fatigue        Subjective   Carla Flores is a 89 y.o. female came in today for her follow-up visit with her daughter.  She has history of ischemic heart disease diagnosed in 96 when she underwent 5 vessel bypass surgery.  She also has sick sinus syndrome for which she has undergone pacemaker placement at the time of surgery and was replaced 3 times last was in 2011 when she also had right atrial lead replacement.  She came today with no new symptoms other than some occasional shortness of breath and feeling little tired and fatigued.  She apparently has labs done at your office recently but I do not have any of the results.  She did undergo a pacemaker check today and found that she has been pacing about 95% at the atrial level and 86% at the ventricular level.  Her battery voltage is dropped to 2.66 indicative of about 5 months of battery life.  The daughter had some questions regarding her lungs apparently she has been coughing and someone told her that she was congested.  She so far has not had any x-ray done.              Cardiac History  Past Surgical History:   Procedure Laterality Date   • CARDIAC PACEMAKER PLACEMENT  03/1996    PPM implant.     • CARDIOVASCULAR STRESS TEST  01/04/2002    D. Myoview- 80% THR, Negative.     • CONVERTED (HISTORICAL) DUPLEX SCANS  02/27/2014    Duplex Venous LXT     • CORONARY ARTERY BYPASS GRAFT  1996     LIMA-LAD, SVG- D1, SVG- Ramus, SVG-OM,and SVG-PDA.     • ECHO - CONVERTED  06/15/2007    EF 50-55%, Mild MR and TR.     • ECHO - CONVERTED  03/03/2010    EF >60%     • ECHO - CONVERTED  05/22/2018    [facility]- Dr. CYNTHIA Hernandez- LVEF 60-65%, no pericardial effusion,    •  PACEMAKER REPLACEMENT  12/31/2003    PPM Change out- (Dr. Contreras.)     • PACEMAKER REPLACEMENT  10/13/2008    PPM Change out- ( Dr. Ojeda)     • PACEMAKER REPLACEMENT  05/05/2011    PPM Change out- (Dr. Ojeda) Implantation of (R) Atrial Lead.         Current Outpatient Medications   Medication Sig Dispense Refill   • acetaminophen (TYLENOL) 500 MG tablet Take 1,000 mg by mouth Every 6 (Six) Hours As Needed for Mild Pain .     • albuterol (PROVENTIL HFA;VENTOLIN HFA) 108 (90 Base) MCG/ACT inhaler Inhale 2 puffs Every 4 (Four) Hours As Needed for Wheezing.     • aspirin 81 MG EC tablet Take 81 mg by mouth Daily.     • atenolol (TENORMIN) 50 MG tablet Take 50 mg by mouth Daily.     • Cranberry 450 MG tablet Take  by mouth.     • docusate calcium (SURFAK) 240 MG capsule Take 240 mg by mouth 2 (Two) Times a Day.     • methenamine (HIPREX) 1 g tablet Take 1 g by mouth Daily.     • mirtazapine (REMERON) 15 MG tablet Take 15 mg by mouth Every Night.     • Omega-3 Fatty Acids (FISH OIL) 1200 MG capsule delayed-release Take  by mouth.     • polyethylene glycol (MIRALAX) packet Take 17 g by mouth Daily.     • vitamin C (ASCORBIC ACID) 500 MG tablet Take 500 mg by mouth 2 (Two) Times a Day.       No current facility-administered medications for this visit.        Allergies  :  Morphine and related       Past Medical History:   Diagnosis Date   • Arthritis    • CAD (coronary artery disease)    • H/O total knee replacement     bilat   • History of cholecystectomy    • History of hip replacement     bilat   • Hypercholesterolemia    • Hypertension    • IHD (ischemic heart disease)      S/P bypass surgery   • MR (mitral regurgitation)     & AR, both moderate   • Myocardial infarction (CMS/HCC)    • pulse oximetry 07/04/2012    overnight SpO2 abnormal, O2 started, refused sleep study   • SOB (shortness of breath)    • SSS (sick sinus syndrome) (CMS/[facility])        Social History     Socioeconomic History   • Marital status:       "Spouse name: Not on file   • Number of children: Not on file   • Years of education: Not on file   • Highest education level: Not on file   Tobacco Use   • Smoking status: Never Smoker   • Smokeless tobacco: Never Used   Substance and Sexual Activity   • Alcohol use: No   • Drug use: No       Family History   Problem Relation Age of Onset   • Other Mother         Possible heart problems   • Heart disease Father    • Heart disease Other        Review of Systems   Constitution: Positive for malaise/fatigue. Negative for decreased appetite.   HENT: Negative for congestion and sore throat.    Eyes: Negative for blurred vision.   Cardiovascular: Positive for dyspnea on exertion. Negative for chest pain and palpitations.   Respiratory: Negative for shortness of breath and snoring.    Endocrine: Negative for cold intolerance and heat intolerance.   Hematologic/Lymphatic: Negative for adenopathy. Does not bruise/bleed easily.   Skin: Negative for itching, nail changes and skin cancer.   Musculoskeletal: Negative for arthritis and myalgias.   Gastrointestinal: Negative for abdominal pain, dysphagia and heartburn.   Genitourinary: Negative for bladder incontinence and frequency.   Neurological: Negative for dizziness, light-headedness, seizures and vertigo.   Psychiatric/Behavioral: Negative for altered mental status.   Allergic/Immunologic: Negative for environmental allergies and hives.       Diabetes- No  Thyroid- normal    Objective     /78   Pulse 72   Ht 154.9 cm (61\")     Physical Exam   Constitutional: She is oriented to person, place, and time. She appears well-developed and well-nourished.   HENT:   Head: Normocephalic.   Nose: Nose normal.   Eyes: Pupils are equal, round, and reactive to light. EOM are normal.   Neck: Normal range of motion. Neck supple.   Cardiovascular: Normal rate, regular rhythm, S1 normal and S2 normal.   Murmur heard.  Pulmonary/Chest: Effort normal and breath sounds normal. "   Abdominal: Soft. Bowel sounds are normal.   Musculoskeletal: Normal range of motion. She exhibits no edema.   Neurological: She is alert and oriented to person, place, and time.   Skin: Skin is warm and dry.   Psychiatric: She has a normal mood and affect.       ECG 12 Lead  Date/Time: 12/18/2019 3:08 PM  Performed by: John cJ MD  Authorized by: John Jc MD   Comparison: compared with previous ECG from 6/20/2018  Comparison to previous ECG: Sensing ventrilce  Rhythm: paced  Rate: normal  QRS axis: normal  Other findings: T wave abnormality    Clinical impression: abnormal EKG                    Assessment/Plan   Patient's There is no height or weight on file to calculate BMI. BMI is within normal parameters. No follow-up required..     Carla was seen today for follow-up, med refill, labs and pacemaker check.    Diagnoses and all orders for this visit:    IHD (ischemic heart disease)    Essential hypertension    SSS (sick sinus syndrome) (CMS/HCC)    Hypercholesteremia    Murmur, cardiac    Hx of CABG       At baseline her heart rate and blood pressure appears stable.  I repeated the EKG to evaluate her for any arrhythmia and found that she is pacing both at the atrial level and ventricular sensing.  She does have some T wave inversion in the inferolateral leads which is new compared to the one in 2018 when she was pacing at the ventricle level also.  Her clinical examination is unremarkable other than some mild mitral regurgitation murmur.    Regarding her ischemic heart disease, she is on aspirin.  At this time continue the same.  She has not had any anginal symptoms so I did not schedule her to undergo any new investigation.    Regarding her hypertension, she is taking Tenormin and the blood pressure appears stable so we will continue the same.    Regarding her cough, she is advised to undergo an x-ray chest.  Apparently that was ordered by you already so will review the x-rays to see  any pulmonary congestion also to rule out any bronchitis    Regarding her hypercholesterolemia, she is not taking any medication at this time.  During her next lab work, we can have it checked    Regarding the pacemaker, I scheduled her to have it rechecked again in 3 months and if it is close to LEE we will have it replaced.    I will really appreciate, if you can send me copy of her labs.                  Electronically signed by John Jc MD December 18, 2019 2:59 PM

## 2020-01-01 ENCOUNTER — PREP FOR SURGERY (OUTPATIENT)
Dept: OTHER | Facility: HOSPITAL | Age: 85
End: 2020-01-01

## 2020-01-01 ENCOUNTER — OFFICE VISIT (OUTPATIENT)
Dept: CARDIOLOGY | Facility: CLINIC | Age: 85
End: 2020-01-01

## 2020-01-01 ENCOUNTER — HOSPITAL ENCOUNTER (OUTPATIENT)
Facility: HOSPITAL | Age: 85
Discharge: HOME | End: 2020-06-18
Attending: INTERNAL MEDICINE | Admitting: NURSE PRACTITIONER

## 2020-01-01 ENCOUNTER — TELEPHONE (OUTPATIENT)
Dept: CARDIOLOGY | Facility: CLINIC | Age: 85
End: 2020-01-01

## 2020-01-01 ENCOUNTER — LAB (OUTPATIENT)
Dept: LAB | Facility: HOSPITAL | Age: 85
End: 2020-01-01

## 2020-01-01 ENCOUNTER — APPOINTMENT (OUTPATIENT)
Dept: LAB | Facility: HOSPITAL | Age: 85
End: 2020-01-01

## 2020-01-01 ENCOUNTER — HOSPITAL ENCOUNTER (OUTPATIENT)
Dept: CARDIOLOGY | Facility: HOSPITAL | Age: 85
End: 2020-01-01

## 2020-01-01 ENCOUNTER — HOSPITAL ENCOUNTER (OUTPATIENT)
Dept: CARDIOLOGY | Facility: HOSPITAL | Age: 85
Discharge: HOME | End: 2020-04-16

## 2020-01-01 ENCOUNTER — OUTSIDE FACILITY SERVICE (OUTPATIENT)
Dept: CARDIOLOGY | Facility: CLINIC | Age: 85
End: 2020-01-01

## 2020-01-01 ENCOUNTER — CONSULT (OUTPATIENT)
Dept: CARDIOLOGY | Facility: CLINIC | Age: 85
End: 2020-01-01

## 2020-01-01 ENCOUNTER — TRANSCRIBE ORDERS (OUTPATIENT)
Dept: ADMINISTRATIVE | Facility: HOSPITAL | Age: 85
End: 2020-01-01

## 2020-01-01 VITALS
HEIGHT: 61 IN | OXYGEN SATURATION: 98 % | HEART RATE: 82 BPM | SYSTOLIC BLOOD PRESSURE: 120 MMHG | DIASTOLIC BLOOD PRESSURE: 70 MMHG

## 2020-01-01 VITALS
DIASTOLIC BLOOD PRESSURE: 62 MMHG | HEART RATE: 80 BPM | OXYGEN SATURATION: 99 % | SYSTOLIC BLOOD PRESSURE: 112 MMHG | TEMPERATURE: 97.4 F | HEIGHT: 61 IN

## 2020-01-01 VITALS
RESPIRATION RATE: 16 BRPM | WEIGHT: 170 LBS | BODY MASS INDEX: 28.32 KG/M2 | HEIGHT: 65 IN | HEART RATE: 80 BPM | DIASTOLIC BLOOD PRESSURE: 60 MMHG | TEMPERATURE: 97 F | OXYGEN SATURATION: 93 % | SYSTOLIC BLOOD PRESSURE: 109 MMHG

## 2020-01-01 VITALS
BODY MASS INDEX: 28.29 KG/M2 | HEART RATE: 68 BPM | DIASTOLIC BLOOD PRESSURE: 70 MMHG | TEMPERATURE: 97.4 F | HEIGHT: 65 IN | SYSTOLIC BLOOD PRESSURE: 124 MMHG

## 2020-01-01 VITALS
BODY MASS INDEX: 28.32 KG/M2 | HEART RATE: 80 BPM | WEIGHT: 170 LBS | SYSTOLIC BLOOD PRESSURE: 94 MMHG | HEIGHT: 65 IN | DIASTOLIC BLOOD PRESSURE: 58 MMHG

## 2020-01-01 DIAGNOSIS — I25.9 IHD (ISCHEMIC HEART DISEASE): ICD-10-CM

## 2020-01-01 DIAGNOSIS — I49.5 SSS (SICK SINUS SYNDROME) (HCC): Primary | ICD-10-CM

## 2020-01-01 DIAGNOSIS — I10 ESSENTIAL HYPERTENSION: ICD-10-CM

## 2020-01-01 DIAGNOSIS — R60.0 BILATERAL LEG EDEMA: Primary | ICD-10-CM

## 2020-01-01 DIAGNOSIS — E78.00 HYPERCHOLESTEREMIA: ICD-10-CM

## 2020-01-01 DIAGNOSIS — R07.89 CHEST TIGHTNESS: ICD-10-CM

## 2020-01-01 DIAGNOSIS — I25.9 IHD (ISCHEMIC HEART DISEASE): Primary | ICD-10-CM

## 2020-01-01 DIAGNOSIS — I25.10 CORONARY ARTERY DISEASE INVOLVING NATIVE CORONARY ARTERY OF NATIVE HEART WITHOUT ANGINA PECTORIS: ICD-10-CM

## 2020-01-01 DIAGNOSIS — I50.22 CHRONIC SYSTOLIC CONGESTIVE HEART FAILURE (HCC): Primary | ICD-10-CM

## 2020-01-01 DIAGNOSIS — I49.5 SSS (SICK SINUS SYNDROME) (HCC): ICD-10-CM

## 2020-01-01 DIAGNOSIS — R06.02 SHORTNESS OF BREATH: ICD-10-CM

## 2020-01-01 DIAGNOSIS — I50.22 CHRONIC SYSTOLIC CONGESTIVE HEART FAILURE (HCC): ICD-10-CM

## 2020-01-01 DIAGNOSIS — I34.0 NON-RHEUMATIC MITRAL REGURGITATION: ICD-10-CM

## 2020-01-01 DIAGNOSIS — R60.0 BILATERAL LEG EDEMA: ICD-10-CM

## 2020-01-01 DIAGNOSIS — I48.19 OTHER PERSISTENT ATRIAL FIBRILLATION (HCC): ICD-10-CM

## 2020-01-01 DIAGNOSIS — R01.1 MURMUR, CARDIAC: ICD-10-CM

## 2020-01-01 DIAGNOSIS — E78.5 HYPERLIPIDEMIA, UNSPECIFIED HYPERLIPIDEMIA TYPE: ICD-10-CM

## 2020-01-01 DIAGNOSIS — H61.21 IMPACTED CERUMEN OF RIGHT EAR: Primary | ICD-10-CM

## 2020-01-01 DIAGNOSIS — R73.9 HYPERGLYCEMIA: ICD-10-CM

## 2020-01-01 DIAGNOSIS — Z95.1 HX OF CABG: ICD-10-CM

## 2020-01-01 DIAGNOSIS — I49.5 SICK SINUS SYNDROME (HCC): Primary | ICD-10-CM

## 2020-01-01 DIAGNOSIS — E55.9 VITAMIN D DEFICIENCY: ICD-10-CM

## 2020-01-01 DIAGNOSIS — I49.5 SICK SINUS SYNDROME (HCC): ICD-10-CM

## 2020-01-01 LAB
25(OH)D3 SERPL-MCNC: 20 NG/ML (ref 30–100)
ALBUMIN SERPL-MCNC: 3.57 G/DL (ref 3.5–5.2)
ALBUMIN/GLOB SERPL: 1.1 G/DL
ALP SERPL-CCNC: 72 U/L (ref 39–117)
ALT SERPL W P-5'-P-CCNC: 7 U/L (ref 1–33)
ANION GAP SERPL CALCULATED.3IONS-SCNC: 13 MMOL/L (ref 5–15)
ANION GAP SERPL CALCULATED.3IONS-SCNC: 15.5 MMOL/L (ref 5–15)
AST SERPL-CCNC: 22 U/L (ref 1–32)
BACTERIA BLD CULT: NORMAL
BACTERIA SPEC AEROBE CULT: ABNORMAL
BASOPHILS # BLD AUTO: 0.03 10*3/MM3 (ref 0–0.2)
BASOPHILS NFR BLD AUTO: 0.4 % (ref 0–1.5)
BH CV ECHO MEAS - ACS: 1.6 CM
BH CV ECHO MEAS - AO MAX PG: 1.8 MMHG
BH CV ECHO MEAS - AO MEAN PG: 1 MMHG
BH CV ECHO MEAS - AO ROOT AREA: 5.3 CM^2
BH CV ECHO MEAS - AO ROOT DIAM: 2.6 CM
BH CV ECHO MEAS - AO V2 MAX: 67.4 CM/SEC
BH CV ECHO MEAS - AO V2 MEAN: 47 CM/SEC
BH CV ECHO MEAS - AO V2 VTI: 10.7 CM
BH CV ECHO MEAS - BZI_METRIC_HEIGHT: 154.9 CM
BH CV ECHO MEAS - EDV(CUBED): 71.5 ML
BH CV ECHO MEAS - EDV(MOD-SP4): 75.7 ML
BH CV ECHO MEAS - EDV(TEICH): 76.4 ML
BH CV ECHO MEAS - EF(CUBED): 33.6 %
BH CV ECHO MEAS - EF(MOD-SP4): 21.1 %
BH CV ECHO MEAS - EF(TEICH): 27.8 %
BH CV ECHO MEAS - ESV(CUBED): 47.4 ML
BH CV ECHO MEAS - ESV(MOD-SP4): 59.7 ML
BH CV ECHO MEAS - ESV(TEICH): 55.2 ML
BH CV ECHO MEAS - FS: 12.8 %
BH CV ECHO MEAS - IVS/LVPW: 1.4
BH CV ECHO MEAS - IVSD: 1.5 CM
BH CV ECHO MEAS - LA DIMENSION: 3.8 CM
BH CV ECHO MEAS - LA/AO: 1.5
BH CV ECHO MEAS - LV IVRT: 0.12 SEC
BH CV ECHO MEAS - LV MASS(C)D: 186.9 GRAMS
BH CV ECHO MEAS - LVIDD: 4.2 CM
BH CV ECHO MEAS - LVIDS: 3.6 CM
BH CV ECHO MEAS - LVLD AP4: 6.7 CM
BH CV ECHO MEAS - LVLS AP4: 6.9 CM
BH CV ECHO MEAS - LVOT AREA (M): 3.1 CM^2
BH CV ECHO MEAS - LVOT AREA: 3.1 CM^2
BH CV ECHO MEAS - LVOT DIAM: 2 CM
BH CV ECHO MEAS - LVPWD: 1 CM
BH CV ECHO MEAS - MV A MAX VEL: 92.4 CM/SEC
BH CV ECHO MEAS - MV DEC SLOPE: 429 CM/SEC^2
BH CV ECHO MEAS - MV E MAX VEL: 91.9 CM/SEC
BH CV ECHO MEAS - MV E/A: 0.99
BH CV ECHO MEAS - RAP SYSTOLE: 10 MMHG
BH CV ECHO MEAS - RVDD: 2.4 CM
BH CV ECHO MEAS - RVSP: 41.6 MMHG
BH CV ECHO MEAS - SV(AO): 56.8 ML
BH CV ECHO MEAS - SV(CUBED): 24 ML
BH CV ECHO MEAS - SV(MOD-SP4): 16 ML
BH CV ECHO MEAS - SV(TEICH): 21.2 ML
BH CV ECHO MEAS - TR MAX VEL: 281 CM/SEC
BILIRUB SERPL-MCNC: 0.3 MG/DL (ref 0.2–1.2)
BUN BLD-MCNC: 16 MG/DL (ref 8–23)
BUN BLD-MCNC: 27 MG/DL (ref 8–23)
BUN/CREAT SERPL: 41 (ref 7–25)
BUN/CREAT SERPL: 47.4 (ref 7–25)
CALCIUM SPEC-SCNC: 9.1 MG/DL (ref 8.2–9.6)
CALCIUM SPEC-SCNC: 9.2 MG/DL (ref 8.2–9.6)
CHLORIDE SERPL-SCNC: 100 MMOL/L (ref 98–107)
CHLORIDE SERPL-SCNC: 93 MMOL/L (ref 98–107)
CHOLEST SERPL-MCNC: 185 MG/DL (ref 0–200)
CHOLEST SERPL-MCNC: 214 MG/DL (ref 0–200)
CO2 SERPL-SCNC: 27.5 MMOL/L (ref 22–29)
CO2 SERPL-SCNC: 36 MMOL/L (ref 22–29)
CREAT BLD-MCNC: 0.39 MG/DL (ref 0.57–1)
CREAT BLD-MCNC: 0.57 MG/DL (ref 0.57–1)
D DIMER PPP FEU-MCNC: 2.88 MCGFEU/ML (ref 0–0.5)
DEPRECATED RDW RBC AUTO: 49.9 FL (ref 37–54)
DEPRECATED RDW RBC AUTO: 60.3 FL (ref 37–54)
EOSINOPHIL # BLD AUTO: 0.13 10*3/MM3 (ref 0–0.4)
EOSINOPHIL NFR BLD AUTO: 1.9 % (ref 0.3–6.2)
ERYTHROCYTE [DISTWIDTH] IN BLOOD BY AUTOMATED COUNT: 14 % (ref 12.3–15.4)
ERYTHROCYTE [DISTWIDTH] IN BLOOD BY AUTOMATED COUNT: 15.8 % (ref 12.3–15.4)
GFR SERPL CREATININE-BSD FRML MDRD: 99 ML/MIN/1.73
GFR SERPL CREATININE-BSD FRML MDRD: >150 ML/MIN/1.73
GLOBULIN UR ELPH-MCNC: 3.2 GM/DL
GLUCOSE BLD-MCNC: 102 MG/DL (ref 65–99)
GLUCOSE BLD-MCNC: 93 MG/DL (ref 65–99)
GRAM STN SPEC: ABNORMAL
HBA1C MFR BLD: 5.5 % (ref 4.8–5.6)
HCT VFR BLD AUTO: 41.1 % (ref 34–46.6)
HCT VFR BLD AUTO: 44.4 % (ref 34–46.6)
HDLC SERPL-MCNC: 46 MG/DL (ref 40–60)
HDLC SERPL-MCNC: 58 MG/DL (ref 40–60)
HGB BLD-MCNC: 13.1 G/DL (ref 12–15.9)
HGB BLD-MCNC: 13.2 G/DL (ref 12–15.9)
IMM GRANULOCYTES # BLD AUTO: 0.01 10*3/MM3 (ref 0–0.05)
IMM GRANULOCYTES NFR BLD AUTO: 0.1 % (ref 0–0.5)
ISOLATED FROM: ABNORMAL
LDLC SERPL CALC-MCNC: 112 MG/DL (ref 0–100)
LDLC SERPL CALC-MCNC: 151 MG/DL (ref 0–100)
LDLC/HDLC SERPL: 1.93 {RATIO}
LDLC/HDLC SERPL: 3.28 {RATIO}
LYMPHOCYTES # BLD AUTO: 1.29 10*3/MM3 (ref 0.7–3.1)
LYMPHOCYTES NFR BLD AUTO: 19 % (ref 19.6–45.3)
MAXIMAL PREDICTED HEART RATE: 126 BPM
MCH RBC QN AUTO: 30.7 PG (ref 26.6–33)
MCH RBC QN AUTO: 30.8 PG (ref 26.6–33)
MCHC RBC AUTO-ENTMCNC: 29.7 G/DL (ref 31.5–35.7)
MCHC RBC AUTO-ENTMCNC: 31.9 G/DL (ref 31.5–35.7)
MCV RBC AUTO: 103.3 FL (ref 79–97)
MCV RBC AUTO: 96.7 FL (ref 79–97)
MONOCYTES # BLD AUTO: 0.54 10*3/MM3 (ref 0.1–0.9)
MONOCYTES NFR BLD AUTO: 8 % (ref 5–12)
NEUTROPHILS # BLD AUTO: 4.79 10*3/MM3 (ref 1.7–7)
NEUTROPHILS NFR BLD AUTO: 70.6 % (ref 42.7–76)
NRBC BLD AUTO-RTO: 0 /100 WBC (ref 0–0.2)
NT-PROBNP SERPL-MCNC: 7608 PG/ML (ref 5–1800)
PLATELET # BLD AUTO: 142 10*3/MM3 (ref 140–450)
PLATELET # BLD AUTO: 194 10*3/MM3 (ref 140–450)
PMV BLD AUTO: 11.5 FL (ref 6–12)
PMV BLD AUTO: 12.6 FL (ref 6–12)
POTASSIUM BLD-SCNC: 3.8 MMOL/L (ref 3.5–5.2)
POTASSIUM BLD-SCNC: 4.6 MMOL/L (ref 3.5–5.2)
PROT SERPL-MCNC: 6.8 G/DL (ref 6–8.5)
RBC # BLD AUTO: 4.25 10*6/MM3 (ref 3.77–5.28)
RBC # BLD AUTO: 4.3 10*6/MM3 (ref 3.77–5.28)
SODIUM BLD-SCNC: 142 MMOL/L (ref 136–145)
SODIUM BLD-SCNC: 143 MMOL/L (ref 136–145)
STRESS TARGET HR: 107 BPM
TRIGL SERPL-MCNC: 75 MG/DL (ref 0–150)
TRIGL SERPL-MCNC: 85 MG/DL (ref 0–150)
TSH SERPL DL<=0.05 MIU/L-ACNC: 5.45 UIU/ML (ref 0.27–4.2)
VLDLC SERPL-MCNC: 15 MG/DL
VLDLC SERPL-MCNC: 17 MG/DL
WBC NRBC COR # BLD: 6.76 10*3/MM3 (ref 3.4–10.8)
WBC NRBC COR # BLD: 6.79 10*3/MM3 (ref 3.4–10.8)

## 2020-01-01 PROCEDURE — 99152 MOD SED SAME PHYS/QHP 5/>YRS: CPT | Performed by: INTERNAL MEDICINE

## 2020-01-01 PROCEDURE — 33228 REMV&REPLC PM GEN DUAL LEAD: CPT | Performed by: INTERNAL MEDICINE

## 2020-01-01 PROCEDURE — 99214 OFFICE O/P EST MOD 30 MIN: CPT | Performed by: INTERNAL MEDICINE

## 2020-01-01 PROCEDURE — 25010000002 FENTANYL CITRATE (PF) 100 MCG/2ML SOLUTION: Performed by: INTERNAL MEDICINE

## 2020-01-01 PROCEDURE — 80053 COMPREHEN METABOLIC PANEL: CPT | Performed by: INTERNAL MEDICINE

## 2020-01-01 PROCEDURE — 25010000003 LIDOCAINE 1 % SOLUTION: Performed by: INTERNAL MEDICINE

## 2020-01-01 PROCEDURE — 85379 FIBRIN DEGRADATION QUANT: CPT | Performed by: INTERNAL MEDICINE

## 2020-01-01 PROCEDURE — 83880 ASSAY OF NATRIURETIC PEPTIDE: CPT | Performed by: INTERNAL MEDICINE

## 2020-01-01 PROCEDURE — 80061 LIPID PANEL: CPT | Performed by: NURSE PRACTITIONER

## 2020-01-01 PROCEDURE — 94799 UNLISTED PULMONARY SVC/PX: CPT

## 2020-01-01 PROCEDURE — 25010000002 MIDAZOLAM PER 1 MG: Performed by: INTERNAL MEDICINE

## 2020-01-01 PROCEDURE — 25010000002 ONDANSETRON PER 1 MG: Performed by: INTERNAL MEDICINE

## 2020-01-01 PROCEDURE — 84443 ASSAY THYROID STIM HORMONE: CPT | Performed by: INTERNAL MEDICINE

## 2020-01-01 PROCEDURE — 93280 PM DEVICE PROGR EVAL DUAL: CPT | Performed by: INTERNAL MEDICINE

## 2020-01-01 PROCEDURE — 87040 BLOOD CULTURE FOR BACTERIA: CPT | Performed by: NURSE PRACTITIONER

## 2020-01-01 PROCEDURE — 94660 CPAP INITIATION&MGMT: CPT

## 2020-01-01 PROCEDURE — 85027 COMPLETE CBC AUTOMATED: CPT | Performed by: NURSE PRACTITIONER

## 2020-01-01 PROCEDURE — 99214 OFFICE O/P EST MOD 30 MIN: CPT | Performed by: NURSE PRACTITIONER

## 2020-01-01 PROCEDURE — 80048 BASIC METABOLIC PNL TOTAL CA: CPT | Performed by: NURSE PRACTITIONER

## 2020-01-01 PROCEDURE — C1785 PMKR, DUAL, RATE-RESP: HCPCS | Performed by: INTERNAL MEDICINE

## 2020-01-01 PROCEDURE — 82306 VITAMIN D 25 HYDROXY: CPT | Performed by: NURSE PRACTITIONER

## 2020-01-01 PROCEDURE — 85025 COMPLETE CBC W/AUTO DIFF WBC: CPT | Performed by: INTERNAL MEDICINE

## 2020-01-01 PROCEDURE — 93306 TTE W/DOPPLER COMPLETE: CPT | Performed by: INTERNAL MEDICINE

## 2020-01-01 PROCEDURE — 25010000003 CEFAZOLIN IN DEXTROSE 2-4 GM/100ML-% SOLUTION: Performed by: NURSE PRACTITIONER

## 2020-01-01 PROCEDURE — 87150 DNA/RNA AMPLIFIED PROBE: CPT | Performed by: NURSE PRACTITIONER

## 2020-01-01 PROCEDURE — 83036 HEMOGLOBIN GLYCOSYLATED A1C: CPT | Performed by: NURSE PRACTITIONER

## 2020-01-01 PROCEDURE — 93280 PM DEVICE PROGR EVAL DUAL: CPT | Performed by: NURSE PRACTITIONER

## 2020-01-01 PROCEDURE — 36415 COLL VENOUS BLD VENIPUNCTURE: CPT

## 2020-01-01 PROCEDURE — 80061 LIPID PANEL: CPT | Performed by: INTERNAL MEDICINE

## 2020-01-01 PROCEDURE — 99153 MOD SED SAME PHYS/QHP EA: CPT | Performed by: INTERNAL MEDICINE

## 2020-01-01 PROCEDURE — 93306 TTE W/DOPPLER COMPLETE: CPT

## 2020-01-01 DEVICE — GEN PM ASSURITY MRI DR RF PM2272: Type: IMPLANTABLE DEVICE | Status: FUNCTIONAL

## 2020-01-01 RX ORDER — IRON POLYSACCHARIDE COMPLEX 150 MG
150 CAPSULE ORAL DAILY
Status: ON HOLD | COMMUNITY
End: 2020-01-01

## 2020-01-01 RX ORDER — FENTANYL CITRATE 50 UG/ML
INJECTION, SOLUTION INTRAMUSCULAR; INTRAVENOUS AS NEEDED
Status: DISCONTINUED | OUTPATIENT
Start: 2020-01-01 | End: 2020-01-01 | Stop reason: HOSPADM

## 2020-01-01 RX ORDER — ACETAMINOPHEN 325 MG/1
650 TABLET ORAL EVERY 4 HOURS PRN
Status: DISCONTINUED | OUTPATIENT
Start: 2020-01-01 | End: 2020-01-01 | Stop reason: HOSPADM

## 2020-01-01 RX ORDER — ONDANSETRON 2 MG/ML
4 INJECTION INTRAMUSCULAR; INTRAVENOUS EVERY 6 HOURS PRN
Status: DISCONTINUED | OUTPATIENT
Start: 2020-01-01 | End: 2020-01-01 | Stop reason: HOSPADM

## 2020-01-01 RX ORDER — ACETAMINOPHEN 325 MG/1
650 TABLET ORAL EVERY 6 HOURS
Status: DISCONTINUED | OUTPATIENT
Start: 2020-01-01 | End: 2020-01-01 | Stop reason: HOSPADM

## 2020-01-01 RX ORDER — NITROGLYCERIN 0.4 MG/1
0.4 TABLET SUBLINGUAL
Status: DISCONTINUED | OUTPATIENT
Start: 2020-01-01 | End: 2020-01-01 | Stop reason: HOSPADM

## 2020-01-01 RX ORDER — ACETAMINOPHEN 325 MG/1
650 TABLET ORAL EVERY 4 HOURS PRN
Status: CANCELLED | OUTPATIENT
Start: 2020-01-01

## 2020-01-01 RX ORDER — ONDANSETRON 2 MG/ML
4 INJECTION INTRAMUSCULAR; INTRAVENOUS EVERY 6 HOURS PRN
Status: CANCELLED | OUTPATIENT
Start: 2020-01-01

## 2020-01-01 RX ORDER — MIDAZOLAM HYDROCHLORIDE 1 MG/ML
INJECTION INTRAMUSCULAR; INTRAVENOUS AS NEEDED
Status: DISCONTINUED | OUTPATIENT
Start: 2020-01-01 | End: 2020-01-01 | Stop reason: HOSPADM

## 2020-01-01 RX ORDER — CEFAZOLIN SODIUM 2 G/100ML
2 INJECTION, SOLUTION INTRAVENOUS ONCE
Status: COMPLETED | OUTPATIENT
Start: 2020-01-01 | End: 2020-01-01

## 2020-01-01 RX ORDER — CYPROHEPTADINE HYDROCHLORIDE 4 MG/1
4 TABLET ORAL 3 TIMES DAILY PRN
COMMUNITY

## 2020-01-01 RX ORDER — SODIUM CHLORIDE 0.9 % (FLUSH) 0.9 %
3 SYRINGE (ML) INJECTION EVERY 12 HOURS SCHEDULED
Status: DISCONTINUED | OUTPATIENT
Start: 2020-01-01 | End: 2020-01-01 | Stop reason: HOSPADM

## 2020-01-01 RX ORDER — LIDOCAINE HYDROCHLORIDE 10 MG/ML
INJECTION, SOLUTION INFILTRATION; PERINEURAL AS NEEDED
Status: DISCONTINUED | OUTPATIENT
Start: 2020-01-01 | End: 2020-01-01 | Stop reason: HOSPADM

## 2020-01-01 RX ORDER — SODIUM CHLORIDE 0.9 % (FLUSH) 0.9 %
10 SYRINGE (ML) INJECTION AS NEEDED
Status: DISCONTINUED | OUTPATIENT
Start: 2020-01-01 | End: 2020-01-01 | Stop reason: HOSPADM

## 2020-01-01 RX ORDER — CEFAZOLIN SODIUM 2 G/100ML
2 INJECTION, SOLUTION INTRAVENOUS ONCE
Status: CANCELLED | OUTPATIENT
Start: 2020-01-01 | End: 2020-01-01

## 2020-01-01 RX ORDER — SODIUM CHLORIDE 0.9 % (FLUSH) 0.9 %
10 SYRINGE (ML) INJECTION AS NEEDED
Status: CANCELLED | OUTPATIENT
Start: 2020-01-01

## 2020-01-01 RX ORDER — CARVEDILOL 6.25 MG/1
6.25 TABLET ORAL 2 TIMES DAILY WITH MEALS
COMMUNITY

## 2020-01-01 RX ORDER — FUROSEMIDE 40 MG/1
40 TABLET ORAL DAILY
Qty: 90 TABLET | Refills: 1 | Status: SHIPPED | OUTPATIENT
Start: 2020-01-01

## 2020-01-01 RX ORDER — SODIUM CHLORIDE 0.9 % (FLUSH) 0.9 %
3 SYRINGE (ML) INJECTION EVERY 12 HOURS SCHEDULED
Status: CANCELLED | OUTPATIENT
Start: 2020-01-01

## 2020-01-01 RX ORDER — ONDANSETRON 2 MG/ML
INJECTION INTRAMUSCULAR; INTRAVENOUS AS NEEDED
Status: DISCONTINUED | OUTPATIENT
Start: 2020-01-01 | End: 2020-01-01 | Stop reason: HOSPADM

## 2020-01-01 RX ORDER — IBUPROFEN 600 MG/1
600 TABLET ORAL EVERY 6 HOURS SCHEDULED
Status: DISCONTINUED | OUTPATIENT
Start: 2020-01-01 | End: 2020-01-01 | Stop reason: HOSPADM

## 2020-01-01 RX ORDER — NITROGLYCERIN 0.4 MG/1
0.4 TABLET SUBLINGUAL
Status: CANCELLED | OUTPATIENT
Start: 2020-01-01

## 2020-01-01 RX ORDER — METHENAMINE HIPPURATE 1000 MG/1
1 TABLET ORAL DAILY
COMMUNITY

## 2020-01-01 RX ORDER — ACETAMINOPHEN 325 MG/1
650 TABLET ORAL EVERY 6 HOURS
Status: DISCONTINUED | OUTPATIENT
Start: 2020-01-01 | End: 2020-01-01

## 2020-01-01 RX ORDER — OMEPRAZOLE 20 MG/1
20 CAPSULE, DELAYED RELEASE ORAL DAILY
Status: ON HOLD | COMMUNITY
End: 2020-01-01

## 2020-01-01 RX ADMIN — CEFAZOLIN SODIUM 2 G: 2 INJECTION, SOLUTION INTRAVENOUS at 15:25

## 2020-04-13 NOTE — TELEPHONE ENCOUNTER
She is scheduled for PPM check Wednesday, daughter is asking if she needs to be seen also. We are not sure if they are coming for that clinic. Daughter hates to get her out but she has been having issues as noted below.       Daughter called. Episode of increased SOB, making her anxiety worse, seems to be having decreased output of urine. Having a moderate amount of edema in lower extremities that does not go away after elevating, somewhat better in the morning but still there. PCP had labs on 3/26/20. They are in the computer for you to review. Vitals unknown, reports normal when [facility] came to her house on 3/26. Meds as noted in chart.

## 2020-04-13 NOTE — TELEPHONE ENCOUNTER
Do you want to see her after the PPM check or did you want to make any changes in meds due to symptoms?

## 2020-04-14 NOTE — TELEPHONE ENCOUNTER
Pt's daughter aware to have her here at 1:30 for PPM check and that Dr Jc will see her after that.

## 2020-04-15 PROBLEM — R06.02 SHORTNESS OF BREATH: Status: ACTIVE | Noted: 2020-01-01

## 2020-04-15 PROBLEM — R60.0 BILATERAL LEG EDEMA: Status: ACTIVE | Noted: 2020-01-01

## 2020-04-15 PROBLEM — R07.89 CHEST TIGHTNESS: Status: ACTIVE | Noted: 2020-01-01

## 2020-04-15 NOTE — PROGRESS NOTES
Chief Complaint   Patient presents with   • Follow-up     for cardiac management   • Shortness of Breath     SOB increasing over the past few weeks, making anxiety worse. Now not wanting to lay down due to being afraid of having SOB. PCP has added Buspar to help.    • Edema     increased edema over the past few days, was unable to get her shoes on today.    • Pacemaker Check     St Demetris checked today, generator is nearing EOL.    • Labs     [facility] checked labs in March, in chart to review.   Dr Molina is checking  ABG's today at [facility], can have more labs done if you feel needed.        CARDIAC COMPLAINTS  chest pressure/discomfort, dyspnea, fatigue and lower extremity edema        Subjective   Carla Floers is a 89 y.o. female came in today for her follow-up visit along with her daughter.  She has history of ischemic heart disease for which she has undergone bypass surgery in 96.  She has history of sick sinus syndrome for which she has undergone pacemaker placement also in 96.  She also has history of generalized weakness and shortness of breath.  Her daughter called her stating that she has been having worsening of shortness of breath for the last few weeks.  It started about 10 to 15 days ago when she suddenly became short winded associated with chest tightness in the form of pressure-like feeling in the middle part of the chest.  She also started noticing worsening of her leg edema in the last few days.  She did undergo her lab work recently and found to have a normal renal function.  I do not have any other labs.  She also sees a pulmonologist who wanted an ABG done.  She is scheduled to have it done today at the hospital.  She came today to be evaluated for this new symptoms and also have the pacemaker checked.  Her pacemaker check showed that she is pacing around 92% at the atrium and 85% and at the ventricle.  Her battery voltage is 2.59 she did reach her LEE now.            Cardiac History  Past Surgical  History:   Procedure Laterality Date   • CARDIAC PACEMAKER PLACEMENT  03/1996    PPM implant.     • CARDIOVASCULAR STRESS TEST  01/04/2002    [facility] [place]- 80% THR, Negative.     • CONVERTED (HISTORICAL) DUPLEX SCANS  02/27/2014    Duplex Venous LXT     • CORONARY ARTERY BYPASS GRAFT  1996     LIMA-LAD, SVG- D1, SVG- Ramus, SVG-OM,and SVG-PDA.     • ECHO - CONVERTED  06/15/2007    EF 50-55%, Mild MR and TR.     • ECHO - CONVERTED  03/03/2010    EF >60%     • ECHO - CONVERTED  05/22/2018    [facility]- Dr. CYNTHIA Hernandez- LVEF 60-65%, no pericardial effusion,    • PACEMAKER REPLACEMENT  12/31/2003    PPM Change out- (Dr. Contreras.)     • PACEMAKER REPLACEMENT  10/13/2008    PPM Change out- ( Dr. Ojeda)     • PACEMAKER REPLACEMENT  05/05/2011    PPM Change out- (Dr. Ojeda) Implantation of (R) Atrial Lead.         Current Outpatient Medications   Medication Sig Dispense Refill   • acetaminophen (TYLENOL) 500 MG tablet Take 1,000 mg by mouth Every 6 (Six) Hours As Needed for Mild Pain .     • albuterol (PROVENTIL HFA;VENTOLIN HFA) 108 (90 Base) MCG/ACT inhaler Inhale 2 puffs Every 4 (Four) Hours As Needed for Wheezing.     • aspirin 81 MG EC tablet Take 81 mg by mouth Daily.     • atenolol (TENORMIN) 50 MG tablet Take 50 mg by mouth Daily.     • Cranberry 450 MG tablet Take  by mouth.     • docusate calcium (SURFAK) 240 MG capsule Take 240 mg by mouth 2 (Two) Times a Day.     • iron polysaccharides (NIFEREX) 150 MG capsule Take 150 mg by mouth Daily.     • mirtazapine (REMERON) 15 MG tablet Take 15 mg by mouth Every Night.     • O2 (OXYGEN) Inhale As Needed.     • Omega-3 Fatty Acids (FISH OIL) 1200 MG capsule delayed-release Take  by mouth.     • omeprazole (priLOSEC) 20 MG capsule Take 20 mg by mouth Daily.     • polyethylene glycol (MIRALAX) packet Take 17 g by mouth Daily.     • vitamin C (ASCORBIC ACID) 500 MG tablet Take 500 mg by mouth 2 (Two) Times a Day.     • methenamine (HIPREX) 1 g tablet Take 1 g by mouth Daily.       No  current facility-administered medications for this visit.        Allergies  :  Morphine and related       Past Medical History:   Diagnosis Date   • Arthritis    • CAD (coronary artery disease)    • H/O total knee replacement     bilat   • History of cholecystectomy    • History of hip replacement     bilat   • Hypercholesterolemia    • Hypertension    • IHD (ischemic heart disease)      S/P bypass surgery   • MR (mitral regurgitation)     & AR, both moderate   • Myocardial infarction (CMS/[facility])    • pulse oximetry 07/04/2012    overnight SpO2 abnormal, O2 started, refused sleep study   • SOB (shortness of breath)    • SSS (sick sinus syndrome) (CMS/[facility])        Social History     Socioeconomic History   • Marital status:      Spouse name: Not on file   • Number of children: Not on file   • Years of education: Not on file   • Highest education level: Not on file   Tobacco Use   • Smoking status: Never Smoker   • Smokeless tobacco: Never Used   Substance and Sexual Activity   • Alcohol use: No   • Drug use: No       Family History   Problem Relation Age of Onset   • Other Mother         Possible heart problems   • Heart disease Father    • Heart disease Other        Review of Systems   Constitution: Negative for decreased appetite and malaise/fatigue.   HENT: Negative for congestion and sore throat.    Eyes: Negative for blurred vision.   Cardiovascular: Positive for chest pain, dyspnea on exertion and leg swelling.   Respiratory: Positive for shortness of breath. Negative for snoring.    Endocrine: Negative for cold intolerance and heat intolerance.   Hematologic/Lymphatic: Negative for adenopathy. Does not bruise/bleed easily.   Skin: Negative for itching, nail changes and skin cancer.   Musculoskeletal: Negative for arthritis and myalgias.   Gastrointestinal: Negative for abdominal pain, dysphagia and heartburn.   Genitourinary: Negative for bladder incontinence and frequency.   Neurological: Negative for  "dizziness, light-headedness, seizures and vertigo.   Psychiatric/Behavioral: Negative for altered mental status. The patient is nervous/anxious.    Allergic/Immunologic: Negative for environmental allergies and hives.       Diabetes- No  Thyroid- normal    Objective     /62   Pulse 80   Temp 97.4 °F (36.3 °C)   Ht 154.9 cm (61\")   SpO2 99%     Physical Exam   Constitutional: She is oriented to person, place, and time. She appears well-developed and well-nourished.   HENT:   Head: Normocephalic.   Nose: Nose normal.   Eyes: Pupils are equal, round, and reactive to light. EOM are normal.   Neck: Normal range of motion. Neck supple.   Cardiovascular: Normal rate, regular rhythm, S1 normal and S2 normal.   Murmur heard.  Pulmonary/Chest: Effort normal and breath sounds normal.   Abdominal: Soft. Bowel sounds are normal.   Musculoskeletal: Normal range of motion. She exhibits edema.   Neurological: She is alert and oriented to person, place, and time.   Skin: Skin is warm and dry.   Psychiatric: She has a normal mood and affect.     Procedures            Assessment/Plan   Patient's There is no height or weight on file to calculate BMI. BMI is above normal parameters. Recommendations include: nutrition counseling.     Carla was seen today for follow-up, shortness of breath, edema, pacemaker check and labs.    Diagnoses and all orders for this visit:    IHD (ischemic heart disease)  -     Adult Transthoracic Echo Complete W/ Cont if Necessary Per Protocol; Future  -     Stress Test With Myocardial Perfusion One Day; Future  -     CBC & Differential; Future    Essential hypertension  -     Comprehensive Metabolic Panel; Future    SSS (sick sinus syndrome) (CMS/HCC)  -     TSH; Future    Hypercholesteremia  -     Lipid Panel; Future    Murmur, cardiac  -     Adult Transthoracic Echo Complete W/ Cont if Necessary Per Protocol; Future    Shortness of breath  -     Adult Transthoracic Echo Complete W/ Cont if " Necessary Per Protocol; Future  -     D-dimer, Quantitative; Future  -     proBNP; Future    Bilateral leg edema  -     Adult Transthoracic Echo Complete W/ Cont if Necessary Per Protocol; Future  -     D-dimer, Quantitative; Future  -     proBNP; Future  -     TSH; Future    Chest tightness  -     Stress Test With Myocardial Perfusion One Day; Future       At baseline her heart rate is stable.  Her blood pressure is normal.  Her clinical examination reveals slight increase in JVD no carotid bruit slightly loud second heart sound and a short systolic murmur at the mitral as well as the aortic area.  She does have about 2+ pedal edema.  She has normal peripheral pulse.      Her main symptoms at this time appears to be the shortness of breath.  I explained to her as well as the daughter that this could be related to 3 different problems.  Since she did have some chest tightness also, it could be related to worsening of ischemia causing the symptoms.  The other possibility of this being secondary to her pacemaker causing her to have dyssynchrony and LV dysfunction.  The third possibility could be related to DVT and PE.    I scheduled her to undergo a stress test to evaluate her for ischemia.  She is also advised to have lab work done to check her blood count as well as the lipids.    I also scheduled her to undergo an echocardiogram to evaluate for LV function, dyssynchrony, PA pressure.  She is also advised to have the BNP level checked along with the electrolytes.    I also advised her to have lab work to check for d-dimer.  If it is abnormal then she may need to undergo venous Doppler ultrasound of her lower extremities and also may need to undergo CTA of chest is a PE protocol.    If it is due to dyssynchrony, when she have the pacemaker replaced in the next few weeks she may need the pacemaker to be upgraded to BIV PPM.    If it is due to ischemia, then she may need to be treated with nitrates.  At this time I am  going to try my best to avoid invasive procedure if possible.    If all the tests come back negative, she may be need to be treated for her anxiety.    Based on the results, further recommendations will be made.                      Electronically signed by John Jc MD April 15, 2020 14:53

## 2020-04-17 NOTE — PROGRESS NOTES
Pt's daughter  aware of results and recommendations that she will benefit with BIV PM. She is going to discuss with family and let us know whether here or to [facility] .

## 2020-04-17 NOTE — TELEPHONE ENCOUNTER
----- Message from John Jc MD sent at 4/17/2020 10:20 AM EDT -----  Need CTA.  Order is in.  Lasix 40 mg/ daily

## 2020-04-21 NOTE — PROGRESS NOTES
Pt's daughter  aware of results and recommendations to continue the Lasix and if no improvement in 1 week that she may need a pleural tap. Daughter is going to call Monday with a progress report or sooner if pt develops more problems.

## 2020-04-24 NOTE — TELEPHONE ENCOUNTER
Lm for pt's daughter to call me back regarding referral to [facility] for gen change and upgrade to BIV PPM.

## 2020-05-01 NOTE — TELEPHONE ENCOUNTER
Discussed at length with Wendi that I had talked with Dr Jc after receiving his response and he does feel like the potential  benifit does outweigh the possible risk but that is something they will have to decide for themselves.  Understanding voiced.

## 2020-06-05 PROBLEM — I25.10 CORONARY ARTERY DISEASE: Status: ACTIVE | Noted: 2020-01-01

## 2020-06-05 PROBLEM — I50.22 CHRONIC SYSTOLIC CONGESTIVE HEART FAILURE (HCC): Status: ACTIVE | Noted: 2020-01-01

## 2020-06-05 NOTE — H&P (VIEW-ONLY)
Cardiac Electrophysiology Outpatient Consult Note            [facility]     Consult Note     Carla Flores  [id number]  06/05/2020       Primary Care Physician: Mariah Gill APRN    Referred By: John Jc MD    Subjective     Chief Complaint:   Chief Complaint   Patient presents with   • sss   • Shortness of Breath     Problem List:      1. Sick Sinus Syndrome  a. Initial PM Implant 1996  b. Documented Generator changes in 2003,2009,& 2011  c. Implanted [employer]ris dual-chamber pacemaker  2. Systolic congestive heart failure  a. ECHO 5/22/2018 EF 60-65%  b. ECHO 4/16/2020 EF 26-30%, moderate MR, mild LVH, LA 3.8 cm  3. Coronary Artery Disease  a. Patient is S/P CABG 1996, documented LIMA-LAD, SVG- D1, SVG- Ramus, SVG-OM,and SVG-PDA.   4. Essential hypertension  5. Dyslipidemia  6. Myocardial infarction  7. Valvular heart disease  a. Documented moderate MR and AR      History of Present Illness: Ms. Carla Flores is a 89-year-old white female seen in EP consultation today at the request of Dr. Jc for her history of sick sinus syndrome status post pacemaker implant recently found to be at LEE on device interrogation.  She is also noted to have increasing NYHA class III CHF symptomology with recent echo revealing EF 26-30% and documented consideration for upgrade to biventricular CRT-P pacemaker.  Upon presentation today she reports continued NYHA class III symptomology with fatigue, shortness of breath, and dyspnea on minimal exertion.  Ms. Flores is a 89-year-old who requires total care at home per her daughter and is wheelchair-bound requiring Nneka lift assistance for bed and wheelchair placement.  She wears continuous 2 L oxygen and uses a trilogy machine at night for her respiratory status.  She denies chest pain, palpitations, dizziness, presyncope, or syncope.  Her vitals are acceptable in office today.  She admits compliance to medication therapy without  noted side effects.        Review of Systems:   Constitutional: No fevers or chills, no recent weight gain or weight loss or fatigue  Eyes: No visual loss, blurred vision, double vision, yellow sclerae.  ENT: No headaches, hearing loss, vertigo, congestion or sore throat.   Cardiovascular: Per HPI  Respiratory: No cough or wheezing, no sputum production, no hematemesis   Gastrointestinal: No abdominal pain, no nausea, vomiting, constipation, diarrhea, melena.   Genitourinary: No dysuria, hematuria or increased frequency.  Musculoskeletal:  No gait disturbance, weakness or joint pain or stiffness  Integumentary: No rashes, urticaria, ulcers or sores.   Neurological: No headache, dizziness, syncope, paralysis, ataxia, no prior CVA/TIA  Psychiatric: No anxiety, or depression  Endocrine: No diaphoresis, cold or heat intolerance. No polyuria or polydipsia.   Hematologic/Lymphatic: No anemia, abnormal bruising or bleeding. No history of DVT/PE.        Past Medical History:   Past Medical History:   Diagnosis Date   • Arthritis    • CAD (coronary artery disease)    • H/O total knee replacement     bilat   • History of cholecystectomy    • History of hip replacement     bilat   • Hypercholesterolemia    • Hypertension    • IHD (ischemic heart disease)      S/P bypass surgery   • MR (mitral regurgitation)     & AR, both moderate   • Myocardial infarction (CMS/HCC)    • pulse oximetry 07/04/2012    overnight SpO2 abnormal, O2 started, refused sleep study   • SOB (shortness of breath)    • SSS (sick sinus syndrome) (CMS/HCC)        Past Surgical History:   Past Surgical History:   Procedure Laterality Date   • CARDIAC PACEMAKER PLACEMENT  03/1996    PPM implant.     • CARDIOVASCULAR STRESS TEST  01/04/2002    [facility] [place]- 80% THR, Negative.     • CONVERTED (HISTORICAL) DUPLEX SCANS  02/27/2014    Duplex Venous LXT     • CORONARY ARTERY BYPASS GRAFT  1996     LIMA-LAD, SVG- D1, SVG- Ramus, SVG-OM,and SVG-PDA.     • ECHO -  CONVERTED  06/15/2007    EF 50-55%, Mild MR and TR.     • ECHO - CONVERTED  03/03/2010    EF >60%     • ECHO - CONVERTED  05/22/2018    [facility]- Dr. CYNTHIA Hernandez- LVEF 60-65%, no pericardial effusion,    • ECHO - CONVERTED  04/16/2020    [facility]. EF 30%. Septal WMA. Mod MR. RVSP- 42 mmHg.   • PACEMAKER REPLACEMENT  12/31/2003    PPM Change out- (Dr. Contreras.)     • PACEMAKER REPLACEMENT  10/13/2008    PPM Change out- ( Dr. Ojeda)     • PACEMAKER REPLACEMENT  05/05/2011    PPM Change out- (Dr. Ojeda) Implantation of (R) Atrial Lead.         Family History:   Family History   Problem Relation Age of Onset   • Other Mother         Possible heart problems   • Heart disease Father    • Heart disease Other        Social History:   Social History     Socioeconomic History   • Marital status:      Spouse name: Not on file   • Number of children: Not on file   • Years of education: Not on file   • Highest education level: Not on file   Tobacco Use   • Smoking status: Never Smoker   • Smokeless tobacco: Never Used   Substance and Sexual Activity   • Alcohol use: No   • Drug use: No       Medications:     Current Outpatient Medications:   •  acetaminophen (TYLENOL) 500 MG tablet, Take 1,000 mg by mouth Every 6 (Six) Hours As Needed for Mild Pain ., Disp: , Rfl:   •  albuterol (PROVENTIL HFA;VENTOLIN HFA) 108 (90 Base) MCG/ACT inhaler, Inhale 2 puffs Every 4 (Four) Hours As Needed for Wheezing., Disp: , Rfl:   •  aspirin 81 MG EC tablet, Take 81 mg by mouth Daily., Disp: , Rfl:   •  atenolol (TENORMIN) 50 MG tablet, Take 50 mg by mouth Daily., Disp: , Rfl:   •  Cranberry 450 MG tablet, Take  by mouth Daily., Disp: , Rfl:   •  docusate calcium (SURFAK) 240 MG capsule, Take 240 mg by mouth 2 (Two) Times a Day., Disp: , Rfl:   •  furosemide (LASIX) 40 MG tablet, Take 1 tablet by mouth Daily., Disp: 90 tablet, Rfl: 1  •  iron polysaccharides (NIFEREX) 150 MG capsule, Take 150 mg by mouth Daily., Disp: , Rfl:   •  methenamine (HIPREX) 1 g  "tablet, Take 0.5 g by mouth 2 (Two) Times a Day With Meals., Disp: , Rfl:   •  mirtazapine (REMERON) 15 MG tablet, Take 15 mg by mouth Every Night., Disp: , Rfl:   •  Multiple Vitamins-Minerals (ZINC PO), Take  by mouth Daily., Disp: , Rfl:   •  O2 (OXYGEN), Inhale As Needed., Disp: , Rfl:   •  Omega-3 Fatty Acids (FISH OIL) 1200 MG capsule delayed-release, Take  by mouth Daily., Disp: , Rfl:   •  omeprazole (priLOSEC) 20 MG capsule, Take 20 mg by mouth Daily., Disp: , Rfl:   •  polyethylene glycol (MIRALAX) packet, Take 17 g by mouth Daily., Disp: , Rfl:   •  vitamin C (ASCORBIC ACID) 500 MG tablet, Take 500 mg by mouth 2 (Two) Times a Day., Disp: , Rfl:     Allergies:   Allergies   Allergen Reactions   • Morphine And Related        Objective     Physical Exam:  Vital Signs:   Vitals:    06/05/20 1220   BP: 120/70   BP Location: Left arm   Patient Position: Sitting   Pulse: 82   SpO2: 98%   Weight: Comment: roughly 170. In wheelchair.   Height: 154.9 cm (61\")     GEN: Well nourished, well-developed, no acute distress  HEENT: Normocephalic, atraumatic, moist mucous membranes  NECK: Supple, no JVD, no thyromegaly, no lymphadenopathy  CARD: Regular rate and rhythm, normal S1 & S2 are present.  No murmur, gallop or rubs are appreciated.  LUNGS: Clear to auscultation bilateraly, normal respiratory effort  ABDOMEN: Soft, nontender, normal bowel sounds  EXTREMITIES: No gross deformities, no clubbing, cyanosis.   No Edema  SKIN: Warm, dry  NEURO: No focal deficits, alert and oriented x 3  PSYCHIATRIC: Normal affect and mood, appropriate use of semantics and logic.      Lab Results   Component Value Date    GLUCOSE 102 (H) 04/16/2020    CALCIUM 9.2 04/16/2020     04/16/2020    K 4.6 04/16/2020    CO2 27.5 04/16/2020     04/16/2020    BUN 16 04/16/2020    CREATININE 0.39 (L) 04/16/2020    EGFRIFNONA >150 04/16/2020    BCR 41.0 (H) 04/16/2020    ANIONGAP 15.5 (H) 04/16/2020     Lab Results   Component Value Date   "    WBC 6.79 04/16/2020    HGB 13.2 04/16/2020    HCT 44.4 04/16/2020    .3 (H) 04/16/2020     04/16/2020     No results found for: INR, PROTIME  Lab Results   Component Value Date    TSH 5.450 (H) 04/16/2020       Cardiac Testing:      I personally viewed and interpreted the patient's EKG/Telemetry/lab data      ECG 12 Lead  Date/Time: 6/5/2020 12:39 PM  Performed by: Moises Bennett APRN  Authorized by: Moises Bennett APRN   Comparison: compared with previous ECG from 12/18/2019  Similar to previous ECG  Rhythm: paced  BPM: 80            Device interrogation: [employer]ris dual-chamber pacemaker at DDD at 70 bpm, a paced 39%, V paced 71%, acceptable lead threshold and impedance, device noted to be at LEE on 4/21/2020, mode switch 51% noted.    Assessment & Plan      Carla was seen today for sss and shortness of breath.    Diagnoses and all orders for this visit:    SSS (sick sinus syndrome) (CMS/HCC)    Chronic systolic congestive heart failure (CMS/HCC)    Essential hypertension    Coronary artery disease involving native coronary artery of native heart without angina pectoris    Paroxysmal atrial fibrillation    Other orders  -     ECG 12 Lead    Plan: Ms. Carla Flores is seen in EP consultation today for the evaluation of her past medical history significant for sick sinus syndrome status post permanent pacemaker implant with recent device interrogation revealing device past LEE.  The risk-benefit profile for upgrade to biventricular pacemaker with Ms. Flores was discussed and reviewed in detail.  At this time it is recommended that she simply undergo device generator change only considering her overall state of health and multiple comorbidities.  We will plan for schedule generator change within the next 2 weeks.  We will plan to see patient back in [place] office with me for wound check and discussion about 51% A. fib burden noted on device interrogation today and risk/benefit of  anticoagulation therapy again with her advanced age and multiple comorbidities.    Follow Up: Status post procedure    Electronically signed by TIERRA Buitrago, 06/05/20, 1:31 PM.      Thank you for allowing me to participate in the care of your patient. Please to not hesitate to contact me with additional questions or concerns.

## 2020-06-05 NOTE — PROGRESS NOTES
Cardiac Electrophysiology Outpatient Consult Note            [facility]     Consult Note     Carla Flores  [id number]  06/05/2020       Primary Care Physician: Mariah Gill APRN    Referred By: John Jc MD    Subjective     Chief Complaint:   Chief Complaint   Patient presents with   • sss   • Shortness of Breath     Problem List:      1. Sick Sinus Syndrome  a. Initial PM Implant 1996  b. Documented Generator changes in 2003,2009,& 2011  c. Implanted Saint Demetris dual-chamber pacemaker  2. Systolic congestive heart failure  a. ECHO 5/22/2018 EF 60-65%  b. ECHO 4/16/2020 EF 26-30%, moderate MR, mild LVH, LA 3.8 cm  3. Coronary Artery Disease  a. Patient is S/P CABG 1996, documented LIMA-LAD, SVG- D1, SVG- Ramus, SVG-OM,and SVG-PDA.   4. Essential hypertension  5. Dyslipidemia  6. Myocardial infarction  7. Valvular heart disease  a. Documented moderate MR and AR      History of Present Illness: Ms. Carla Flores is a 89-year-old white female seen in EP consultation today at the request of Dr. Jc for her history of sick sinus syndrome status post pacemaker implant recently found to be at LEE on device interrogation.  She is also noted to have increasing NYHA class III CHF symptomology with recent echo revealing EF 26-30% and documented consideration for upgrade to biventricular CRT-P pacemaker.  Upon presentation today she reports continued NYHA class III symptomology with fatigue, shortness of breath, and dyspnea on minimal exertion.  Ms. Flores is a 89-year-old who requires total care at home per her daughter and is wheelchair-bound requiring Nneka lift assistance for bed and wheelchair placement.  She wears continuous 2 L oxygen and uses a trilogy machine at night for her respiratory status.  She denies chest pain, palpitations, dizziness, presyncope, or syncope.  Her vitals are acceptable in office today.  She admits compliance to medication therapy without  noted side effects.        Review of Systems:   Constitutional: No fevers or chills, no recent weight gain or weight loss or fatigue  Eyes: No visual loss, blurred vision, double vision, yellow sclerae.  ENT: No headaches, hearing loss, vertigo, congestion or sore throat.   Cardiovascular: Per HPI  Respiratory: No cough or wheezing, no sputum production, no hematemesis   Gastrointestinal: No abdominal pain, no nausea, vomiting, constipation, diarrhea, melena.   Genitourinary: No dysuria, hematuria or increased frequency.  Musculoskeletal:  No gait disturbance, weakness or joint pain or stiffness  Integumentary: No rashes, urticaria, ulcers or sores.   Neurological: No headache, dizziness, syncope, paralysis, ataxia, no prior CVA/TIA  Psychiatric: No anxiety, or depression  Endocrine: No diaphoresis, cold or heat intolerance. No polyuria or polydipsia.   Hematologic/Lymphatic: No anemia, abnormal bruising or bleeding. No history of DVT/PE.        Past Medical History:   Past Medical History:   Diagnosis Date   • Arthritis    • CAD (coronary artery disease)    • H/O total knee replacement     bilat   • History of cholecystectomy    • History of hip replacement     bilat   • Hypercholesterolemia    • Hypertension    • IHD (ischemic heart disease)      S/P bypass surgery   • MR (mitral regurgitation)     & AR, both moderate   • Myocardial infarction (CMS/HCC)    • pulse oximetry 07/04/2012    overnight SpO2 abnormal, O2 started, refused sleep study   • SOB (shortness of breath)    • SSS (sick sinus syndrome) (CMS/HCC)        Past Surgical History:   Past Surgical History:   Procedure Laterality Date   • CARDIAC PACEMAKER PLACEMENT  03/1996    PPM implant.     • CARDIOVASCULAR STRESS TEST  01/04/2002    [facility]- 80% THR, Negative.     • CONVERTED (HISTORICAL) DUPLEX SCANS  02/27/2014    Duplex Venous LXT     • CORONARY ARTERY BYPASS GRAFT  1996     LIMA-LAD, SVG- D1, SVG- Ramus, SVG-OM,and SVG-PDA.     • ECHO -  CONVERTED  06/15/2007    EF 50-55%, Mild MR and TR.     • ECHO - CONVERTED  03/03/2010    EF >60%     • ECHO - CONVERTED  05/22/2018    [facility]- Dr. CYNTHIA Hernandez- LVEF 60-65%, no pericardial effusion,    • ECHO - CONVERTED  04/16/2020    [facility]. EF 30%. Septal WMA. Mod MR. RVSP- 42 mmHg.   • PACEMAKER REPLACEMENT  12/31/2003    PPM Change out- (Dr. Contreras.)     • PACEMAKER REPLACEMENT  10/13/2008    PPM Change out- ( Dr. Ojeda)     • PACEMAKER REPLACEMENT  05/05/2011    PPM Change out- (Dr. Ojeda) Implantation of (R) Atrial Lead.         Family History:   Family History   Problem Relation Age of Onset   • Other Mother         Possible heart problems   • Heart disease Father    • Heart disease Other        Social History:   Social History     Socioeconomic History   • Marital status:      Spouse name: Not on file   • Number of children: Not on file   • Years of education: Not on file   • Highest education level: Not on file   Tobacco Use   • Smoking status: Never Smoker   • Smokeless tobacco: Never Used   Substance and Sexual Activity   • Alcohol use: No   • Drug use: No       Medications:     Current Outpatient Medications:   •  acetaminophen (TYLENOL) 500 MG tablet, Take 1,000 mg by mouth Every 6 (Six) Hours As Needed for Mild Pain ., Disp: , Rfl:   •  albuterol (PROVENTIL HFA;VENTOLIN HFA) 108 (90 Base) MCG/ACT inhaler, Inhale 2 puffs Every 4 (Four) Hours As Needed for Wheezing., Disp: , Rfl:   •  aspirin 81 MG EC tablet, Take 81 mg by mouth Daily., Disp: , Rfl:   •  atenolol (TENORMIN) 50 MG tablet, Take 50 mg by mouth Daily., Disp: , Rfl:   •  Cranberry 450 MG tablet, Take  by mouth Daily., Disp: , Rfl:   •  docusate calcium (SURFAK) 240 MG capsule, Take 240 mg by mouth 2 (Two) Times a Day., Disp: , Rfl:   •  furosemide (LASIX) 40 MG tablet, Take 1 tablet by mouth Daily., Disp: 90 tablet, Rfl: 1  •  iron polysaccharides (NIFEREX) 150 MG capsule, Take 150 mg by mouth Daily., Disp: , Rfl:   •  methenamine (HIPREX) 1 g  "tablet, Take 0.5 g by mouth 2 (Two) Times a Day With Meals., Disp: , Rfl:   •  mirtazapine (REMERON) 15 MG tablet, Take 15 mg by mouth Every Night., Disp: , Rfl:   •  Multiple Vitamins-Minerals (ZINC PO), Take  by mouth Daily., Disp: , Rfl:   •  O2 (OXYGEN), Inhale As Needed., Disp: , Rfl:   •  Omega-3 Fatty Acids (FISH OIL) 1200 MG capsule delayed-release, Take  by mouth Daily., Disp: , Rfl:   •  omeprazole (priLOSEC) 20 MG capsule, Take 20 mg by mouth Daily., Disp: , Rfl:   •  polyethylene glycol (MIRALAX) packet, Take 17 g by mouth Daily., Disp: , Rfl:   •  vitamin C (ASCORBIC ACID) 500 MG tablet, Take 500 mg by mouth 2 (Two) Times a Day., Disp: , Rfl:     Allergies:   Allergies   Allergen Reactions   • Morphine And Related        Objective     Physical Exam:  Vital Signs:   Vitals:    06/05/20 1220   BP: 120/70   BP Location: Left arm   Patient Position: Sitting   Pulse: 82   SpO2: 98%   Weight: Comment: roughly 170. In wheelchair.   Height: 154.9 cm (61\")     GEN: Well nourished, well-developed, no acute distress  HEENT: Normocephalic, atraumatic, moist mucous membranes  NECK: Supple, no JVD, no thyromegaly, no lymphadenopathy  CARD: Regular rate and rhythm, normal S1 & S2 are present.  No murmur, gallop or rubs are appreciated.  LUNGS: Clear to auscultation bilateraly, normal respiratory effort  ABDOMEN: Soft, nontender, normal bowel sounds  EXTREMITIES: No gross deformities, no clubbing, cyanosis.   No Edema  SKIN: Warm, dry  NEURO: No focal deficits, alert and oriented x 3  PSYCHIATRIC: Normal affect and mood, appropriate use of semantics and logic.      Lab Results   Component Value Date    GLUCOSE 102 (H) 04/16/2020    CALCIUM 9.2 04/16/2020     04/16/2020    K 4.6 04/16/2020    CO2 27.5 04/16/2020     04/16/2020    BUN 16 04/16/2020    CREATININE 0.39 (L) 04/16/2020    EGFRIFNONA >150 04/16/2020    BCR 41.0 (H) 04/16/2020    ANIONGAP 15.5 (H) 04/16/2020     Lab Results   Component Value Date   "    WBC 6.79 04/16/2020    HGB 13.2 04/16/2020    HCT 44.4 04/16/2020    .3 (H) 04/16/2020     04/16/2020     No results found for: INR, PROTIME  Lab Results   Component Value Date    TSH 5.450 (H) 04/16/2020       Cardiac Testing:      I personally viewed and interpreted the patient's EKG/Telemetry/lab data      ECG 12 Lead  Date/Time: 6/5/2020 12:39 PM  Performed by: Moises Bennett APRN  Authorized by: Moises Bennett APRN   Comparison: compared with previous ECG from 12/18/2019  Similar to previous ECG  Rhythm: paced  BPM: 80            Device interrogation: Saint Demetris dual-chamber pacemaker at DDD at 70 bpm, a paced 39%, V paced 71%, acceptable lead threshold and impedance, device noted to be at LEE on 4/21/2020, mode switch 51% noted.    Assessment & Plan      Carla was seen today for sss and shortness of breath.    Diagnoses and all orders for this visit:    SSS (sick sinus syndrome) (CMS/HCC)    Chronic systolic congestive heart failure (CMS/HCC)    Essential hypertension    Coronary artery disease involving native coronary artery of native heart without angina pectoris    Paroxysmal atrial fibrillation    Other orders  -     ECG 12 Lead    Plan: Ms. Carla Flores is seen in EP consultation today for the evaluation of her past medical history significant for sick sinus syndrome status post permanent pacemaker implant with recent device interrogation revealing device past LEE.  The risk-benefit profile for upgrade to biventricular pacemaker with Ms. Flores was discussed and reviewed in detail.  At this time it is recommended that she simply undergo device generator change only considering her overall state of health and multiple comorbidities.  We will plan for schedule generator change within the next 2 weeks.  We will plan to see patient back in [place] office with me for wound check and discussion about 51% A. fib burden noted on device interrogation today and risk/benefit of  anticoagulation therapy again with her advanced age and multiple comorbidities.    Follow Up: Status post procedure    Electronically signed by TIERRA Buitrago, 06/05/20, 1:31 PM.      Thank you for allowing me to participate in the care of your patient. Please to not hesitate to contact me with additional questions or concerns.

## 2020-06-18 NOTE — OP NOTE
Cardiac Electrophysiology Procedure Note      [facility]     PROCEDURE: PACEMAKER GENERATOR REPLACEMENT //     OPERATION PERFORMED:     1. Explantation of Saint Demetris dual-chamber model PM 2210 dual-chamber permanent pacemaker implanted on May 5 of 2011 with serial #[id number].  2. Testing of retained pacing leads:        A. Atrial lead Saint Demetris model 2088 TC, 46 cm, VEP [id number] implanted on May 5, 2011.        B. Right ventricular lead Saint Demetris model 1246T, 52 cm, CV 65538 implanted on March 12, 1996.    3. Implantation of Saint Demetris dual-chamber permanent pacemaker model PM 2272 pulse generator with serial number [id number].    ATTENDING SURGEON: Jose Lawson DO    MODERATE SEDATION FOR PROCEDURE:    Moderate sedation was given during this procedure.    I supervised and directed Maryellen Galicia RN to administer this sedation.  This staff member also monitored the patient's hemodynamic and respiratory status and response to these medications.  Please see the full detailed procedure report generated by the electrophysiology laboratory staff.  The patient tolerated moderate sedation well.  There were no complications regarding sedation.  The total dose of fentanyl was 25 Mcg and the total dose of midazolam was 1 mg.  The total dose of Brevital was 20 mg.  First sedation was administered at 1515 and continued through 1544.    ESTIMATED BLOOD LOSS: less than 20cc    COMPLICATIONS: None    TIME OUT: Time out was completed with verification of the correct patient identity, procedure to be performed, procedure site and implanted equipment.    INDICATION FOR PROCEDURE:  Briefly,Carla Flores is a 89 y.o. female with a history of sinus node dysfunction who was initially implanted with a dual chamber pacemaker on March 12 of 1996.  The patient was recently seen in our device clinic at which time the patient's device was discovered to be at the elective replacement interval.  The patient was  deemed appropriate for a generator replacement.    The patient was able to give written informed consent after revisiting the key portions of the risk versus benefit profile of the procedure.  This discussion was framed by our lengthy conversations  (please see our detailed notes).  Patient verbalized strong understanding of this discussion and a strong desire to proceed with the procedure.  Please note that this detailed informed consent process utilized mutual and shared decision making process between all parties involved, principally the physician and patient, but also potentially with input from the patient's selected family and friends.    PROCEDURE AND FINDINGS:  The patient was brought to the electrophysiology suite in a post absorptive state.  Informed consent was obtained prior to the procedure and confirmed.  Intravenous prophylactic antibiotics were administered and confirmed to be completely infused prior to the start of the procedure.  After the site of implantation was prepped and draped in the usual sterile fashion and after adequate anesthesia was given, the skin was infiltrated with 1% lidocaine and 0.5% bupivicaine 50/50 mixture.  The skin was incised with a #10 scalpel.  Blunt and electrosurgical dissection was carried out to the pulse generator pocket.  The leads were carefully isolated with blunt and electrosurgical dissection.  Careful attention was paid not to damage the leads.  The pulse generator was explanted and the pocket was copiously irrigated with antibiotic containing normal saline and subsequently observed.  Once adequate hemostasis was confirmed within the pocket, the leads were detached from the pulse generator.  The leads were tested for adequate sensing, impedances and pacing thresholds.  The lead tips for all leads were cleaned and dried thoroughly.  The leads were attached to the appropriate ports on the new pulse generator.  Tug testing was performed on all connections.  The  device and leads were placed within the pocket such that the coiled redundant leads were posterior to the pulse generator.  Once again, the device was tested for adequate sensing, impedances and pacing thresholds.  The pulse generator was then sutured to the fascia in a medial location within the pocket using non absorbable suture.  The pocket was closed with two layers of suture using 2-0 then 3-0 Vicryl, followed by a layer of surgical adhesive and finally a sterile occlusive dressing.                     MEASURED DEVICE DATA:    Atrial lead                   sensin mV                   impedance:           300 ohms                   Threshold:            Atrial fibrillation    RV lead                   sensin mV                   pacing impedance:    630 ohms                   Threshold:                  0.65 volts at 0.5 ms                                                        PROGRAMMED PARAMETERS:    Mode:                                 DDDR  Lower Rate:                       70 pulses per minute  Upper Sensor Rate:          130 pulses per minute  Upper Tracking Rate:      130 pulses per minute  Mode Switch Rate:           170 bpm      CONCLUSION:  Successful pacemaker generator change.      Patient is to follow up with our clinic in approximately one week and then myself in 3 months.    No lovenox or heparin at any dose is to be given.    FOR THE PATIENT    Please do not lift more than 10 pounds or abduct the shoulder joint / or raise the arm above the shoulder for 6 weeks after device was implanted (this does not apply to subcutaneous ICDs).    Avoid activities that involve heavy lifting or rough contact that could result in blows to your implant site and to allow your incision time to heal.    No shower or getting device incision wet for 2 days post-operatively.    Keep wound exposed to air unless otherwise instructed, the surgical glue is the bandage.    No  creams, lotions, or powders to incision.    Please avoid allowing bra strap or suspenders to lay over incision until completely healed.    Avoid hot tubs or pools until incision completely healed.    No driving for 24 hours post-operatively after device implant.    Call your doctor if you have any swelling, redness or discharge around your incision, notice anything unusual or unexpected, or you develop a fever that does not go away in two or three days.    Call your doctor if you hear any beeping sounds / vibratory alerts from your device as this indicates your device needs to be checked immediately.    Carry your Medical Device ID Card with you at all times.  Please call our office () with any questions about the device or the wounds.            Jose Lawson DO, FACC, RS  Cardiac Electrophysiologist  [facility] / [facility]

## 2020-06-18 NOTE — INTERVAL H&P NOTE
CC: Pacemaker generator at LEE    History of present illness: Ms.Freda Flores is a 89-year-old white female recently seen in EP consultation at the request of Dr. Jc for evaluation of her pacemaker generator that had reached LEE.  She has a known history of sick sinus syndrome status post initial device implant in 1996 with multiple generator replacement since that time.  Her device is noted to be at LEE with recommended generator change.  Upon presentation today she reports being in her usual state of health.  Ms. Flores is known to be bedridden at home with family use of Nneka lift to move patient from bed to wheelchair and total care now for several years.  It was noted in consultation that patient had identified 51% mode switch with atrial fibrillation which was a new diagnosis for her today.  Upon further questioning today patient denies feeling tachypalpitations, or dizziness.  She denies TIA/strokelike symptoms.  She denies ever being diagnosed with atrial arrhythmias which is confirmed by her daughter who coordinates her care.  Patient educated on her risk of stroke with atrial fibrillation and a chads vas score equal to 6.  Will address initiation of blood thinners prior to discharge and see patient back in follow-up for continued education.  Patient denies recent infections or signs of fever, chills, sweats, or cough.  Patient has a negative coverage screening within the last 72 hours documented on chart.  All risk, benefits, and alternatives to procedure were outlined reviewed in detail.  Patient verbalizes complete understanding of the procedure and willing to proceed as scheduled.    Constitutional: No fevers or chills, no recent weight gain or weight loss, + fatigue  Eyes: No visual loss, blurred vision, double vision, yellow sclerae.  ENT: No headaches, hearing loss, vertigo, congestion or sore throat.   Cardiovascular: Per HPI  Respiratory: No cough or wheezing, no sputum production, no  hematemesis   Gastrointestinal: No abdominal pain, no nausea, vomiting, constipation, diarrhea, melena.   Genitourinary: No dysuria, hematuria or increased frequency.  Musculoskeletal:  No gait disturbance, weakness or joint pain or stiffness  Integumentary: No rashes, urticaria, ulcers or sores.   Neurological: No headache, dizziness, syncope, paralysis, ataxia, no prior CVA/TIA  Psychiatric: No anxiety, or depression  Endocrine: No diaphoresis, cold or heat intolerance. No polyuria or polydipsia.   Hematologic/Lymphatic: No anemia, abnormal bruising or bleeding. No history of DVT/PE.    Lab Results   Component Value Date    WBC 6.76 06/18/2020    HGB 13.1 06/18/2020    HCT 41.1 06/18/2020    MCV 96.7 06/18/2020     06/18/2020     Lab Results   Component Value Date    GLUCOSE 102 (H) 04/16/2020    CALCIUM 9.2 04/16/2020     04/16/2020    K 4.6 04/16/2020    CO2 27.5 04/16/2020     04/16/2020    BUN 16 04/16/2020    CREATININE 0.39 (L) 04/16/2020    EGFRIFNONA >150 04/16/2020    BCR 41.0 (H) 04/16/2020    ANIONGAP 15.5 (H) 04/16/2020     /64 (BP Location: Left arm, Patient Position: Lying)   Pulse 93   Temp 97 °F (36.1 °C) (Temporal)   Resp 16   SpO2 97%     Tele:  Paced 80 bpm    GEN: Well nourished, well-developed, no acute distress  HEENT: Normocephalic, atraumatic, moist mucous membranes  NECK: Supple, no JVD, no thyromegaly, no lymphadenopathy  CARD: Regular rate and rhythm, normal S1 & S2 are present.  No murmur, gallop or rubs are appreciated.  LUNGS: Clear to auscultation bilateraly, normal respiratory effort  ABDOMEN: Soft, nontender, normal bowel sounds  EXTREMITIES: No gross deformities, no clubbing, cyanosis.  Moderate BLE Edema   SKIN: Warm, dry  NEURO: No focal deficits, alert and oriented x 3  PSYCHIATRIC: Normal affect and mood, appropriate use of semantics and logic.    Electronically signed by TIERRA Buitrago, 06/18/20, 11:29 AM.

## 2020-08-19 PROBLEM — I34.0 NON-RHEUMATIC MITRAL REGURGITATION: Status: ACTIVE | Noted: 2020-01-01

## 2020-08-24 NOTE — TELEPHONE ENCOUNTER
Pt's daughter called, pt was diagnosed with UTI last week and started on antibiotic. Progressively getting worse instead of better. Now having a lot of SOB.  She is taking her to ER for evaluation. Just wanted you to be aware in case you are called for something cardiac.

## 2020-10-02 NOTE — PROGRESS NOTES
Cardiac Electrophysiology Outpatient Follow Up Note            [facility] at [facility]    Follow Up Office Visit      Carla Flores  [id number]  10/02/2020  [unfilled]  [unfilled]    Primary Care Physician: Mariah Gill APRN    Referred By: No ref. provider found    Subjective     Chief Complaint:   Chief Complaint   Patient presents with   • Congestive Heart Failure   • sss   • IHD       History of Present Illness:   Mrs. Carla Flores is a 89 y.o. female who presents to my electrophysiology clinic for follow up of pacemaker replacement recently.  She is here with her daughter.  She remains wheelchair-bound.  She was diagnosed with tracheal stenosis apparently also may be some esophageal stenosis issues.  She is on a puréed diet now.  Apart from this no major changes.    No chest pain nausea vomit fevers or chills..      Review of Systems:   Constitutional: No fevers or chills, no recent weight gain or weight loss or fatigue  Eyes: No visual loss, blurred vision, double vision, yellow sclerae.  ENT: No headaches, hearing loss, vertigo, congestion or sore throat.   Cardiovascular: Per HPI  Respiratory: No cough or wheezing, no sputum production, no hematemesis   Gastrointestinal: No abdominal pain, no nausea, vomiting, constipation, diarrhea, melena.   Genitourinary: No dysuria, hematuria or increased frequency.  Musculoskeletal:  No gait disturbance, weakness or joint pain or stiffness  Integumentary: No rashes, urticaria, ulcers or sores.   Neurological: No headache, dizziness, syncope, paralysis, ataxia, no prior CVA/TIA  Psychiatric: No anxiety, or depression  Endocrine: No diaphoresis, cold or heat intolerance. No polyuria or polydipsia.   Hematologic/Lymphatic: No anemia, abnormal bruising or bleeding. No history of DVT/PE.      Past Medical History:   Past Medical History:   Diagnosis Date   • Arthritis    • CAD (coronary artery disease)    • H/O total knee  replacement     bilat   • History of cholecystectomy    • History of hip replacement     bilat   • Hypercholesterolemia    • Hypertension    • IHD (ischemic heart disease)      S/P bypass surgery   • MR (mitral regurgitation)     & AR, both moderate   • Myocardial infarction (CMS/HCC)    • pulse oximetry 07/04/2012    overnight SpO2 abnormal, O2 started, refused sleep study   • SOB (shortness of breath)    • SSS (sick sinus syndrome) (CMS/HCC)        Past Surgical History:   Past Surgical History:   Procedure Laterality Date   • CARDIAC ELECTROPHYSIOLOGY PROCEDURE N/A 6/18/2020    Procedure: PPM battery change generator change as soon as possible.  Preferably within the next 2 weeks if possible.;  Surgeon: Jose Lawson DO;  Location:  [facility];  Service: Cardiology;  Laterality: N/A;   • CARDIAC PACEMAKER PLACEMENT  03/1996    PPM implant.     • CARDIOVASCULAR STRESS TEST  01/04/2002    [facility]- 80% THR, Negative.     • CONVERTED (HISTORICAL) DUPLEX SCANS  02/27/2014    Duplex Venous LXT     • CORONARY ARTERY BYPASS GRAFT  1996     LIMA-LAD, SVG- D1, SVG- Ramus, SVG-OM,and SVG-PDA.     • ECHO - CONVERTED  06/15/2007    EF 50-55%, Mild MR and TR.     • ECHO - CONVERTED  03/03/2010    EF >60%     • ECHO - CONVERTED  05/22/2018    [facility]- Dr. CYNTHIA Hernandez- LVEF 60-65%, no pericardial effusion,    • ECHO - CONVERTED  04/16/2020    TLS. EF 30%. Septal WMA. Mod MR. RVSP- 42 mmHg.   • PACEMAKER REPLACEMENT  12/31/2003    PPM Change out- (Dr. Contreras.)     • PACEMAKER REPLACEMENT  10/13/2008    PPM Change out- ( Dr. Ojeda)     • PACEMAKER REPLACEMENT  05/05/2011    PPM Change out- (Dr. Ojeda) Implantation of (R) Atrial Lead.         Family History:   Family History   Problem Relation Age of Onset   • Other Mother         Possible heart problems   • Heart disease Father    • Heart disease Other        Social History:   Social History     Socioeconomic History   • Marital status:      Spouse name: Not on  file   • Number of children: Not on file   • Years of education: Not on file   • Highest education level: Not on file   Tobacco Use   • Smoking status: Never Smoker   • Smokeless tobacco: Never Used   Substance and Sexual Activity   • Alcohol use: No   • Drug use: No   • Sexual activity: Defer       Medications:     Current Outpatient Medications:   •  acetaminophen (TYLENOL) 500 MG tablet, Take 1,000 mg by mouth Every 6 (Six) Hours As Needed for Mild Pain ., Disp: , Rfl:   •  albuterol (PROVENTIL HFA;VENTOLIN HFA) 108 (90 Base) MCG/ACT inhaler, Inhale 2 puffs Every 4 (Four) Hours As Needed for Wheezing., Disp: , Rfl:   •  aspirin 81 MG EC tablet, Take 81 mg by mouth Daily., Disp: , Rfl:   •  carvedilol (COREG) 6.25 MG tablet, Take 6.25 mg by mouth 2 (Two) Times a Day With Meals., Disp: , Rfl:   •  Cranberry 450 MG tablet, Take  by mouth Daily., Disp: , Rfl:   •  cyproheptadine (PERIACTIN) 4 MG tablet, Take 4 mg by mouth 3 (Three) Times a Day As Needed for Allergies., Disp: , Rfl:   •  docusate calcium (SURFAK) 240 MG capsule, Take 240 mg by mouth 2 (Two) Times a Day., Disp: , Rfl:   •  furosemide (LASIX) 40 MG tablet, Take 1 tablet by mouth Daily. (Patient taking differently: Take 60 mg by mouth 2 (Two) Times a Day.), Disp: 90 tablet, Rfl: 1  •  methenamine (HIPREX) 1 g tablet, Take 1 g by mouth Daily., Disp: , Rfl:   •  mirtazapine (REMERON) 15 MG tablet, Take 15 mg by mouth Every Night., Disp: , Rfl:   •  O2 (OXYGEN), Inhale 3 L/min As Needed., Disp: , Rfl:   •  polyethylene glycol (MIRALAX) packet, Take 17 g by mouth Daily., Disp: , Rfl:   •  vitamin C (ASCORBIC ACID) 500 MG tablet, Take 500 mg by mouth 2 (Two) Times a Day., Disp: , Rfl:   •  zinc oxide 13 % cream cream, Apply  topically to the appropriate area as directed Every 1 (One) Hour As Needed., Disp: , Rfl:     Allergies:   Allergies   Allergen Reactions   • Morphine And Related Unknown - High Severity       Objective     Physical Exam:  Vital Signs:  "  Vitals:    10/02/20 1117   BP: 94/58   BP Location: Right arm   Patient Position: Sitting   Pulse: 80   Weight: 77.1 kg (170 lb)   Height: 165.1 cm (65\")     GEN: Well nourished, well-developed, no acute distress  HEENT: Normocephalic, atraumatic, moist mucous membranes  NECK: Supple, no JVD, no thyromegaly, no lymphadenopathy  CARD: Regular rate and rhythm, normal S1 & S2 are present.  No murmur, gallop or rubs are appreciated.  LUNGS: Clear to auscultation bilateraly, normal respiratory effort  ABDOMEN: Soft, nontender, normal bowel sounds  EXTREMITIES: No gross deformities, no clubbing, cyanosis.  Edema none  SKIN: Warm, dry  NEURO: No focal deficits, alert and oriented x 3  PSYCHIATRIC: Normal affect and mood, appropriate use of semantics and logic.        Lab Results   Component Value Date    GLUCOSE 93 06/18/2020    CALCIUM 9.1 06/18/2020     06/18/2020    K 3.8 06/18/2020    CO2 36.0 (H) 06/18/2020    CL 93 (L) 06/18/2020    BUN 27 (H) 06/18/2020    CREATININE 0.57 06/18/2020    EGFRIFNONA 99 06/18/2020    BCR 47.4 (H) 06/18/2020    ANIONGAP 13.0 06/18/2020     Lab Results   Component Value Date    WBC 6.76 06/18/2020    HGB 13.1 06/18/2020    HCT 41.1 06/18/2020    MCV 96.7 06/18/2020     06/18/2020     No results found for: INR, PROTIME  Lab Results   Component Value Date    TSH 5.450 (H) 04/16/2020       Cardiac Testing:     I personally viewed and interpreted the patient's EKG/Telemetry/lab data    Procedures    Tobacco Cessation: N/A  Obstructive Sleep Apnea Screening: N/A    Assessment & Plan      Pleasant female patient 89 years of age history of complete heart block dual-chamber [place] permanent pacemaker status post recent pulse generator placement.  Doing exceptionally well.  We will arrange for her to have home monitoring system.  No programming changes were indicated or made today.  We will see her in a year.    Carla was seen today for congestive heart failure, sss and " ihd.    Diagnoses and all orders for this visit:    SSS (sick sinus syndrome) (CMS/[facility])    Chronic systolic congestive heart failure (CMS/HCC)          Follow Up:       Thank you for allowing me to participate in the care of your patient. Please to not hesitate to contact me with additional questions or concerns.        Jose Lawson DO, FACC, [facility]  Cardiac Electrophysiologist

## 2020-11-02 NOTE — TELEPHONE ENCOUNTER
Please cancel her pacemaker check at our office. Dr. Oh manages and I accidentally asked for pacer check at her visit.

## 2023-04-26 NOTE — PROGRESS NOTES
Chief Complaint   Patient presents with   • Follow-up     For cardiac management. Patient is on aspirin. Last lab work was done a few weeks at when she had PPM change out in [place], in chart under labs. States that she does have shortness of breath when she is getting ready for bed or getting ready to go some where.    • Pacemaker Check     Last [facility] PPM check was done today.   • Med Refill     Does not need refills at this time. Brought medication list with visit.        Subjective       Carla Flores is a 89 y.o. female ischemic heart disease for which she has undergone bypass surgery in 96. She has NYHA class III CHF. Most recent echo showed EF 26-30%.  She has history of sick sinus syndrome and pacemaker placed in 96.  At April 2020 visit, her pacemaker check showed  pacing around 92% at the atrium and 85% and at the ventricle.  Battery had reached LEE.   In June 2020 she underwent generator change out without issues. Bi-V pacing not recommended due to state of her health and multiple comorbidities.  Due to atrial fib burden anticoagulation therapy considered but due to her age and bleeding risk associated with multiple comorbidies it was decided to proceed with aspirin only.     Today she returns the office accompanied by her daughter for follow-up visit and pacemaker interrogation. She is total care at home and is wheelchair bound. Overall her shortness of breath is little improved. She uses continuous supplemental oxygen.  No recent chest pain, dizziness or palpitations noted.  Generalized weakness and fatigue are similar.  Edema in her lower legs remains mild.        Cardiac History:    Past Surgical History:   Procedure Laterality Date   • CARDIAC ELECTROPHYSIOLOGY PROCEDURE N/A 6/18/2020    Procedure: PPM battery change generator change as soon as possible.  Preferably within the next 2 weeks if possible.;  Surgeon: Jose Lawson DO;  Location: [facility] INVASIVE LOCATION;  Service: Cardiology;   Laterality: N/A;   • CARDIAC PACEMAKER PLACEMENT  03/1996    PPM implant.     • CARDIOVASCULAR STRESS TEST  01/04/2002    [facility]- 80% THR, Negative.     • CONVERTED (HISTORICAL) DUPLEX SCANS  02/27/2014    Duplex Venous LXT     • CORONARY ARTERY BYPASS GRAFT  1996     LIMA-LAD, SVG- D1, SVG- Ramus, SVG-OM,and SVG-PDA.     • ECHO - CONVERTED  06/15/2007    EF 50-55%, Mild MR and TR.     • ECHO - CONVERTED  03/03/2010    EF >60%     • ECHO - CONVERTED  05/22/2018    [facility]- Dr. CYNTHIA Hernandez- LVEF 60-65%, no pericardial effusion,    • ECHO - CONVERTED  04/16/2020    TLS. EF 30%. Septal WMA. Mod MR. RVSP- 42 mmHg.   • PACEMAKER REPLACEMENT  12/31/2003    PPM Change out- (Dr. Contreras.)     • PACEMAKER REPLACEMENT  10/13/2008    PPM Change out- ( Dr. Ojeda)     • PACEMAKER REPLACEMENT  05/05/2011    PPM Change out- (Dr. Ojeda) Implantation of (R) Atrial Lead.         Current Outpatient Medications   Medication Sig Dispense Refill   • acetaminophen (TYLENOL) 500 MG tablet Take 1,000 mg by mouth Every 6 (Six) Hours As Needed for Mild Pain .     • albuterol (PROVENTIL HFA;VENTOLIN HFA) 108 (90 Base) MCG/ACT inhaler Inhale 2 puffs Every 4 (Four) Hours As Needed for Wheezing.     • aspirin 81 MG EC tablet Take 81 mg by mouth Daily.     • atenolol (TENORMIN) 50 MG tablet Take 50 mg by mouth Daily.     • Cranberry 450 MG tablet Take  by mouth Daily.     • docusate calcium (SURFAK) 240 MG capsule Take 240 mg by mouth 2 (Two) Times a Day.     • furosemide (LASIX) 40 MG tablet Take 1 tablet by mouth Daily. 90 tablet 1   • methenamine (HIPREX) 1 g tablet Take 1 g by mouth Daily.     • mirtazapine (REMERON) 15 MG tablet Take 15 mg by mouth Every Night.     • O2 (OXYGEN) Inhale As Needed.     • Omega-3 Fatty Acids (FISH OIL) 1200 MG capsule delayed-release Take  by mouth Daily.     • polyethylene glycol (MIRALAX) packet Take 17 g by mouth Daily.     • vitamin C (ASCORBIC ACID) 500 MG tablet Take 500 mg by mouth 2 (Two) Times a Day.            No current facility-administered medications for this visit.        Morphine and related    Past Medical History:   Diagnosis Date   • Arthritis    • CAD (coronary artery disease)    • H/O total knee replacement     bilat   • History of cholecystectomy    • History of hip replacement     bilat   • Hypercholesterolemia    • Hypertension    • IHD (ischemic heart disease)      S/P bypass surgery   • MR (mitral regurgitation)     & AR, both moderate   • Myocardial infarction (CMS/[facility])    • pulse oximetry 07/04/2012    overnight SpO2 abnormal, O2 started, refused sleep study   • SOB (shortness of breath)    • SSS (sick sinus syndrome) (CMS/[facility])        Social History     Socioeconomic History   • Marital status:      Spouse name: Not on file   • Number of children: Not on file   • Years of education: Not on file   • Highest education level: Not on file   Tobacco Use   • Smoking status: Never Smoker   • Smokeless tobacco: Never Used   Substance and Sexual Activity   • Alcohol use: No   • Drug use: No   • Sexual activity: Defer       Family History   Problem Relation Age of Onset   • Other Mother         Possible heart problems   • Heart disease Father    • Heart disease Other        Review of Systems   Constitution: Positive for malaise/fatigue. Negative for decreased appetite and diaphoresis.   HENT: Positive for hearing loss. Negative for nosebleeds.    Eyes: Negative for blurred vision.   Cardiovascular: Positive for leg swelling (mild in lower legs). Negative for chest pain, claudication, cyanosis, dyspnea on exertion, irregular heartbeat, near-syncope, orthopnea, palpitations, paroxysmal nocturnal dyspnea and syncope.   Respiratory: Positive for shortness of breath (uses oxygen).    Endocrine: Negative for cold intolerance and heat intolerance.   Hematologic/Lymphatic: Does not bruise/bleed easily.   Skin: Negative for rash.   Musculoskeletal: Positive for muscle weakness. Negative for falls and myalgias.  "  Gastrointestinal: Negative for heartburn, melena and nausea.   Genitourinary: Positive for bladder incontinence, dysuria (slight), frequency and nocturia.   Neurological: Positive for loss of balance (in wheelchair). Negative for dizziness and light-headedness.   Psychiatric/Behavioral: The patient does not have insomnia and is not nervous/anxious.         Objective     /70 (BP Location: Right arm)   Pulse 68   Temp 97.4 °F (36.3 °C)   Ht 165.1 cm (65\")   BMI 28.29 kg/m²     Physical Exam   Constitutional: She is oriented to person, place, and time. She appears well-nourished.   HENT:   Head: Normocephalic.   Eyes: Pupils are equal, round, and reactive to light.   Neck: Normal range of motion.   Cardiovascular: Normal rate, regular rhythm, S1 normal and S2 normal.   Murmur heard.   Systolic murmur is present with a grade of 2/6.  Pulses:       Radial pulses are 2+ on the right side, and 2+ on the left side.   Pulmonary/Chest: No respiratory distress. She has rales (bibasalar).   Abdominal: Soft. Bowel sounds are normal. There is no tenderness.   Musculoskeletal: Normal range of motion. She exhibits edema.   Neurological: She is alert and oriented to person, place, and time.   Skin: Skin is warm. There is pallor (slightly).   Psychiatric: She has a normal mood and affect.        Procedures: pacemaker interrogation done today       Problem List Items Addressed This Visit        Cardiovascular and Mediastinum    SSS (sick sinus syndrome) (CMS/HCC)    Essential hypertension    Hypercholesteremia    Chronic systolic congestive heart failure (CMS/HCC) - Primary    Coronary artery disease    Non-rheumatic mitral regurgitation       Respiratory    Shortness of breath       Other    Hx of CABG    Bilateral leg edema      Other Visit Diagnoses     Other persistent atrial fibrillation (CMS/HCC)             Systolic heart failure- stage 3 symptoms stable. Recent echo shows EF 26-30%. Continue Lasix, beta blocker, " oxygen.     CAD- no anginal symptoms reported. Continue low dose aspirin. Monitor for side effects.     SSS/atrial fib-Pacemaker interrogation done today showing ventricular pacing. 100% mode switches. Due to bleeding risk, aspirin therapy only.     HTN- well controlled.     MR- moderate per recent echo. Clinically appears stable.     Patient's Body mass index is 28.29 kg/m². BMI is above normal parameters. Recommendations include: nutrition counseling. Low salt diet advised for management of heart failure symptoms.      A 6 month follow up visit scheduled and pacemaker interrogation scheduled. Please call sooner or cardiac concerns.            Electronically signed by Nicole Crowder, TIERRA,  August 20, 2020 07:55   [facility] Pregnancy And Lactation Text: This medication is considered safe during pregnancy and breastfeeding.

## (undated) DEVICE — SKIN PREP TRAY W/CHG: Brand: MEDLINE INDUSTRIES, INC.

## (undated) DEVICE — DRSNG TELFA PAD NONADH STR 1S 3X8IN

## (undated) DEVICE — ST EXT IV SMARTSITE 2VLV SP M LL 5ML IV1

## (undated) DEVICE — SOL NACL 0.9PCT 1000ML

## (undated) DEVICE — LIMB HOLDER, WRIST/ANKLE: Brand: DEROYAL

## (undated) DEVICE — MEDI-VAC YANKAUER SUCTION HANDLE W/BULBOUS TIP: Brand: CARDINAL HEALTH

## (undated) DEVICE — DRSNG SURESITE123 4X4.8IN

## (undated) DEVICE — SET PRIMARY GRVTY 10DP MALE LL 104IN

## (undated) DEVICE — ST INF PRI SMRTSTE 20DRP 2VLV 24ML 117

## (undated) DEVICE — TUBING, SUCTION, 1/4" X 10', STRAIGHT: Brand: MEDLINE

## (undated) DEVICE — PLASMABLADE PS210-030S 3.0S LOCK: Brand: PLASMABLADE™

## (undated) DEVICE — CANN NASL CO2 DIVIDED A/

## (undated) DEVICE — ADULT, W/LG. BACK PAD, RADIOTRANSPARENT ELEMENT AND LEAD WIRE: Brand: DEFIBRILLATION ELECTRODES

## (undated) DEVICE — LEX ELECTRO PHYSIOLOGY: Brand: MEDLINE INDUSTRIES, INC.